# Patient Record
Sex: FEMALE | Race: WHITE | NOT HISPANIC OR LATINO | Employment: STUDENT | URBAN - NONMETROPOLITAN AREA
[De-identification: names, ages, dates, MRNs, and addresses within clinical notes are randomized per-mention and may not be internally consistent; named-entity substitution may affect disease eponyms.]

---
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
## 2024-01-08 LAB
BACTERIA SPEC AEROBE CULT: NO GROWTH
C TRACH RRNA SPEC DONR QL NAA+PROBE: NOT DETECTED
CANNABINOIDS SERPL QL: NEGATIVE
COCAINE SERPL CFM-MCNC: NEGATIVE NG/ML
EXTERNAL ABO GROUPING: NORMAL
EXTERNAL AMPHETAMINE SCREEN URINE: NEGATIVE
EXTERNAL ANTIBODY SCREEN: NORMAL
EXTERNAL BARBITURATE SCREEN URINE: NEGATIVE
EXTERNAL BENZODIAZEPINE SCREEN URINE: NEGATIVE
EXTERNAL HEMATOCRIT: 37 %
EXTERNAL HEMOGLOBIN: 11.8 G/DL
EXTERNAL HEPATITIS B SURFACE ANTIGEN: NEGATIVE
EXTERNAL METHADONE SCREEN URINE: NEGATIVE
EXTERNAL PHENCYCLIDINE SCREEN URINE: NEGATIVE
EXTERNAL PLATELET COUNT: 349 K/ΜL
EXTERNAL RH FACTOR: POSITIVE
EXTERNAL SYPHILIS RPR SCREEN: NORMAL
HCV AB S/CO SERPL IA: NORMAL
HIV 1+2 AB+HIV1 P24 AG SERPL QL IA: NORMAL
N GONORRHOEA DNA SPEC QL NAA+PROBE: NOT DETECTED
OPIATES UR QL: NEGATIVE
RUBV IGG SERPL IA-ACNC: NORMAL
VZV IGG SER QL: NORMAL

## 2024-04-30 ENCOUNTER — INITIAL PRENATAL (OUTPATIENT)
Age: 19
End: 2024-04-30
Payer: COMMERCIAL

## 2024-04-30 VITALS — DIASTOLIC BLOOD PRESSURE: 80 MMHG | WEIGHT: 168 LBS | SYSTOLIC BLOOD PRESSURE: 126 MMHG | BODY MASS INDEX: 25.54 KG/M2

## 2024-04-30 DIAGNOSIS — Z34.02 PRIMIGRAVIDA, SECOND TRIMESTER: ICD-10-CM

## 2024-04-30 DIAGNOSIS — Z3A.23 23 WEEKS GESTATION OF PREGNANCY: Primary | ICD-10-CM

## 2024-04-30 PROBLEM — Z34.90 PREGNANCY: Status: ACTIVE | Noted: 2024-04-30

## 2024-04-30 LAB
GLUCOSE UR STRIP-MCNC: NEGATIVE MG/DL
PROT UR STRIP-MCNC: NEGATIVE MG/DL

## 2024-04-30 RX ORDER — PRENATAL WITH FERROUS FUM AND FOLIC ACID 3080; 920; 120; 400; 22; 1.84; 3; 20; 10; 1; 12; 200; 27; 25; 2 [IU]/1; [IU]/1; MG/1; [IU]/1; MG/1; MG/1; MG/1; MG/1; MG/1; MG/1; UG/1; MG/1; MG/1; MG/1; MG/1
TABLET ORAL
COMMUNITY
Start: 2024-01-08

## 2024-04-30 RX ORDER — CALCIUM CARBONATE 500 MG/1
1 TABLET, CHEWABLE ORAL DAILY
COMMUNITY

## 2024-04-30 RX ORDER — DOXYLAMINE SUCCINATE AND PYRIDOXINE HYDROCHLORIDE 20; 20 MG/1; MG/1
TABLET, EXTENDED RELEASE ORAL
COMMUNITY
Start: 2024-01-08

## 2024-04-30 RX ORDER — PROMETHAZINE HYDROCHLORIDE 25 MG/1
TABLET ORAL
COMMUNITY
Start: 2024-01-08

## 2024-05-02 ENCOUNTER — REFERRAL TRIAGE (OUTPATIENT)
Dept: LABOR AND DELIVERY | Facility: HOSPITAL | Age: 19
End: 2024-05-02
Payer: COMMERCIAL

## 2024-05-02 ENCOUNTER — PATIENT OUTREACH (OUTPATIENT)
Dept: LABOR AND DELIVERY | Facility: HOSPITAL | Age: 19
End: 2024-05-02
Payer: COMMERCIAL

## 2024-05-02 NOTE — OUTREACH NOTE
[facility]  Unable to Reach       Questions/Answers      Flowsheet Row Responses   Pending Outreach Confirm Patient Interest   Call Attempt First   Outcome No answer/busy, Left message                Linda Multani RN  Maternity Nurse Navigator    5/2/2024, 15:35 CDT

## 2024-05-07 ENCOUNTER — PATIENT OUTREACH (OUTPATIENT)
Dept: LABOR AND DELIVERY | Facility: HOSPITAL | Age: 19
End: 2024-05-07
Payer: COMMERCIAL

## 2024-05-09 ENCOUNTER — TELEPHONE (OUTPATIENT)
Dept: OBSTETRICS AND GYNECOLOGY | Age: 19
End: 2024-05-09
Payer: COMMERCIAL

## 2024-05-12 PROBLEM — R11.2 NAUSEA AND VOMITING: Status: RESOLVED | Noted: 2021-04-29 | Resolved: 2024-05-12

## 2024-05-12 PROBLEM — R51.9 CHRONIC HEADACHES: Status: RESOLVED | Noted: 2019-11-04 | Resolved: 2024-05-12

## 2024-05-12 PROBLEM — G89.29 CHRONIC HEADACHES: Status: RESOLVED | Noted: 2019-11-04 | Resolved: 2024-05-12

## 2024-05-12 PROBLEM — R07.89 CHEST PAIN, ATYPICAL: Status: RESOLVED | Noted: 2022-01-04 | Resolved: 2024-05-12

## 2024-05-12 PROBLEM — R12 HEARTBURN: Status: RESOLVED | Noted: 2021-04-29 | Resolved: 2024-05-12

## 2024-05-12 PROBLEM — N94.6 DYSMENORRHEA: Status: RESOLVED | Noted: 2020-06-09 | Resolved: 2024-05-12

## 2024-05-12 PROBLEM — R42 LIGHT-HEADED FEELING: Status: RESOLVED | Noted: 2022-01-04 | Resolved: 2024-05-12

## 2024-05-12 PROBLEM — R10.12 LEFT UPPER QUADRANT PAIN: Status: RESOLVED | Noted: 2021-04-29 | Resolved: 2024-05-12

## 2024-05-14 ENCOUNTER — HOSPITAL ENCOUNTER (EMERGENCY)
Facility: HOSPITAL | Age: 19
Discharge: STILL ADMITTED OR ADMINISTRATIVE | End: 2024-05-14
Payer: COMMERCIAL

## 2024-05-14 ENCOUNTER — HOSPITAL ENCOUNTER (OUTPATIENT)
Facility: HOSPITAL | Age: 19
Discharge: HOME | End: 2024-05-14
Attending: OBSTETRICS & GYNECOLOGY | Admitting: OBSTETRICS & GYNECOLOGY
Payer: COMMERCIAL

## 2024-05-14 ENCOUNTER — APPOINTMENT (OUTPATIENT)
Dept: ULTRASOUND IMAGING | Facility: HOSPITAL | Age: 19
End: 2024-05-14
Payer: COMMERCIAL

## 2024-05-14 VITALS
HEART RATE: 97 BPM | OXYGEN SATURATION: 98 % | HEIGHT: 67 IN | TEMPERATURE: 98.8 F | BODY MASS INDEX: 26.68 KG/M2 | RESPIRATION RATE: 17 BRPM | WEIGHT: 170 LBS | DIASTOLIC BLOOD PRESSURE: 78 MMHG | SYSTOLIC BLOOD PRESSURE: 111 MMHG

## 2024-05-14 LAB
BACTERIA UR QL AUTO: ABNORMAL /HPF
BILIRUB UR QL STRIP: NEGATIVE
CLARITY UR: CLEAR
CLUE CELLS SPEC QL WET PREP: ABNORMAL
COLOR UR: YELLOW
FIBRONECTIN FETAL VAG QL: NEGATIVE
GLUCOSE UR STRIP-MCNC: NEGATIVE MG/DL
HGB UR QL STRIP.AUTO: ABNORMAL
HYALINE CASTS UR QL AUTO: ABNORMAL /LPF
HYDATID CYST SPEC WET PREP: ABNORMAL
KETONES UR QL STRIP: NEGATIVE
LEUKOCYTE ESTERASE UR QL STRIP.AUTO: ABNORMAL
NITRITE UR QL STRIP: NEGATIVE
PH UR STRIP.AUTO: 7.5 [PH] (ref 5–8)
PROT UR QL STRIP: ABNORMAL
RBC # UR STRIP: ABNORMAL /HPF
REF LAB TEST METHOD: ABNORMAL
SP GR UR STRIP: 1.01 (ref 1–1.03)
SQUAMOUS #/AREA URNS HPF: ABNORMAL /HPF
T VAGINALIS SPEC QL WET PREP: ABNORMAL
UROBILINOGEN UR QL STRIP: ABNORMAL
WBC # UR STRIP: ABNORMAL /HPF
WBC SPEC QL WET PREP: ABNORMAL
YEAST GENITAL QL WET PREP: ABNORMAL

## 2024-05-14 PROCEDURE — 25810000003 LACTATED RINGERS SOLUTION: Performed by: OBSTETRICS & GYNECOLOGY

## 2024-05-14 PROCEDURE — 25010000002 CEFTRIAXONE PER 250 MG: Performed by: OBSTETRICS & GYNECOLOGY

## 2024-05-14 PROCEDURE — G0378 HOSPITAL OBSERVATION PER HR: HCPCS

## 2024-05-14 PROCEDURE — 87210 SMEAR WET MOUNT SALINE/INK: CPT | Performed by: OBSTETRICS & GYNECOLOGY

## 2024-05-14 PROCEDURE — G0463 HOSPITAL OUTPT CLINIC VISIT: HCPCS

## 2024-05-14 PROCEDURE — 81001 URINALYSIS AUTO W/SCOPE: CPT | Performed by: OBSTETRICS & GYNECOLOGY

## 2024-05-14 PROCEDURE — 87086 URINE CULTURE/COLONY COUNT: CPT | Performed by: OBSTETRICS & GYNECOLOGY

## 2024-05-14 PROCEDURE — 76775 US EXAM ABDO BACK WALL LIM: CPT

## 2024-05-14 PROCEDURE — 82731 ASSAY OF FETAL FIBRONECTIN: CPT | Performed by: OBSTETRICS & GYNECOLOGY

## 2024-05-14 RX ORDER — SODIUM CHLORIDE 0.9 % (FLUSH) 0.9 %
10 SYRINGE (ML) INJECTION EVERY 12 HOURS SCHEDULED
Status: DISCONTINUED | OUTPATIENT
Start: 2024-05-14 | End: 2024-05-14 | Stop reason: HOSPADM

## 2024-05-14 RX ORDER — SODIUM CHLORIDE 0.9 % (FLUSH) 0.9 %
10 SYRINGE (ML) INJECTION AS NEEDED
Status: DISCONTINUED | OUTPATIENT
Start: 2024-05-14 | End: 2024-05-14 | Stop reason: HOSPADM

## 2024-05-14 RX ADMIN — CEFTRIAXONE SODIUM 2000 MG: 2 INJECTION, POWDER, FOR SOLUTION INTRAMUSCULAR; INTRAVENOUS at 09:58

## 2024-05-14 RX ADMIN — SODIUM CHLORIDE, POTASSIUM CHLORIDE, SODIUM LACTATE AND CALCIUM CHLORIDE 2000 ML: 600; 310; 30; 20 INJECTION, SOLUTION INTRAVENOUS at 09:18

## 2024-05-14 NOTE — PROGRESS NOTES
Alexa Fuller  : 2005  MRN: [id number]  CSN: [id number]    Labor & Delivery [facility] progress note    Subjective   Chief Complaint: She reports abdominal and back pain    HPI: Patient reports previously uncomplicated pregnancy with sudden increase in abdominal and back pain today    High Risk Medical Conditions/Complaints this visit:  abdominal pain    Discussion of Management or Test Interpretation with physcian/provider/healthcare provider:    External Records Reviewed: Prenatal [place] [facility] [place] [facility] [place] - Heydi Darden/Outside provider - ANNA Jenkins ultrasound reviewed, pregnancy complicated by: relux    Review Previous Test Results: Prenatal labs in [facility] reviewed, history of: varicella non-immune    Independent Historian: none    Social Determinants to Health: No drug, transportation or housing or other issues noted       Objective   Medical Decision Making:  Amount/Complexity of Data Reviewed  -Labs ordered - ffn, wet prep, urinalysis  -Imaging ordered - none  -IV fluids given - 2 liters  Risk:  Prescription drug management - Keflex  Drug therapy requiring intensive monitoring for toxicity - IV rocephin  Decision to admit patient - no  Diagnosis/Treatment limited by social determinants - no    Min/max vitals past 24 hours:  Temp  Min: 98.8 °F (37.1 °C)  Max: 99 °F (37.2 °C)   BP  Min: 111/78  Max: 120/80   Pulse  Min: 94  Max: 104   Resp  Min: 16  Max: 17        Independent Interpretation NST/FHT's: reassuring and category 1. for gestational age  external monitors used   Cervix: was checked (by RN): 0 cm / 20 % / -3   Contractions:    Review of current test: results none    Abnormal UTI       Final Diagnoses: UTI       Assessment   IUP at 25w3d  UTI     Plan   Follow up with Dr. Gonzales's    Kelsey Jenkins MD  2024  10:49 CDT

## 2024-05-14 NOTE — NURSING NOTE
Patient given written and verbal instructions with maternal warning handout and educated on reasons to return to labor and delivery including increased frequency and strength of contractions, sudden gush/leaking of fluid, vaginal bleeding, decreased fetal movement, blurry vision/spots before eyes, epigastric pain, and headache unrelieved with Tylenol.  Patient instructed to  antibiotic for UTI at [facility] pharmacy and to complete entire course.  Patient instructed to check with provider regarding urine culture to verify that antibiotic is sensitive to growth.  Patient voiced understanding and ambulated off unit with steady gait.

## 2024-05-15 LAB — BACTERIA SPEC AEROBE CULT: NORMAL

## 2024-05-16 DIAGNOSIS — O23.42 URINARY TRACT INFECTION IN MOTHER DURING SECOND TRIMESTER OF PREGNANCY: Primary | ICD-10-CM

## 2024-05-16 RX ORDER — AMPICILLIN 500 MG/1
500 CAPSULE ORAL 4 TIMES DAILY
Qty: 28 CAPSULE | Refills: 0 | Status: SHIPPED | OUTPATIENT
Start: 2024-05-16

## 2024-05-30 ENCOUNTER — ROUTINE PRENATAL (OUTPATIENT)
Dept: OBSTETRICS AND GYNECOLOGY | Age: 19
End: 2024-05-30
Payer: COMMERCIAL

## 2024-05-30 VITALS — SYSTOLIC BLOOD PRESSURE: 112 MMHG | DIASTOLIC BLOOD PRESSURE: 68 MMHG | WEIGHT: 173 LBS | BODY MASS INDEX: 27.1 KG/M2

## 2024-05-30 DIAGNOSIS — Z3A.27 27 WEEKS GESTATION OF PREGNANCY: Primary | ICD-10-CM

## 2024-05-30 DIAGNOSIS — Z34.02 PRIMIGRAVIDA, SECOND TRIMESTER: ICD-10-CM

## 2024-05-30 LAB
GLUCOSE UR STRIP-MCNC: NEGATIVE MG/DL
PROT UR STRIP-MCNC: NEGATIVE MG/DL

## 2024-05-30 NOTE — PROGRESS NOTES
Feeling well, not hurting any.  Good FM.  No abdominal pain.    No LOF or VB  Anatomy scan was normal.  EFW at 24 weeks was 41%  GCT and TDaP today  No questions or concerns.  Diagnoses and all orders for this visit:    1. 27 weeks gestation of pregnancy (Primary)  -     POC Urinalysis Dipstick    2. Primigravida, second trimester

## 2024-05-31 LAB
GLUCOSE 1H P 50 G GLC PO SERPL-MCNC: 80 MG/DL (ref 65–139)
HGB BLD-MCNC: 9.1 G/DL (ref 12–15.9)
RPR SER QL: NON REACTIVE

## 2024-06-05 ENCOUNTER — DOCUMENTATION (OUTPATIENT)
Dept: OBSTETRICS AND GYNECOLOGY | Age: 19
End: 2024-06-05
Payer: COMMERCIAL

## 2024-06-06 ENCOUNTER — HOSPITAL ENCOUNTER (OUTPATIENT)
Facility: HOSPITAL | Age: 19
Discharge: HOME | End: 2024-06-06
Attending: OBSTETRICS & GYNECOLOGY | Admitting: OBSTETRICS & GYNECOLOGY
Payer: COMMERCIAL

## 2024-06-06 ENCOUNTER — TELEPHONE (OUTPATIENT)
Dept: OBSTETRICS AND GYNECOLOGY | Age: 19
End: 2024-06-06
Payer: COMMERCIAL

## 2024-06-06 VITALS
TEMPERATURE: 98.4 F | OXYGEN SATURATION: 97 % | BODY MASS INDEX: 27.06 KG/M2 | HEART RATE: 120 BPM | DIASTOLIC BLOOD PRESSURE: 66 MMHG | HEIGHT: 67 IN | SYSTOLIC BLOOD PRESSURE: 115 MMHG | WEIGHT: 172.4 LBS

## 2024-06-06 LAB
BACTERIA UR QL AUTO: ABNORMAL /HPF
BILIRUB UR QL STRIP: NEGATIVE
CLARITY UR: ABNORMAL
CLUE CELLS SPEC QL WET PREP: ABNORMAL
COLOR UR: YELLOW
GLUCOSE UR STRIP-MCNC: NEGATIVE MG/DL
HGB UR QL STRIP.AUTO: NEGATIVE
HYALINE CASTS UR QL AUTO: ABNORMAL /LPF
HYDATID CYST SPEC WET PREP: ABNORMAL
KETONES UR QL STRIP: NEGATIVE
LEUKOCYTE ESTERASE UR QL STRIP.AUTO: ABNORMAL
NITRITE UR QL STRIP: NEGATIVE
PH UR STRIP.AUTO: >=9 [PH] (ref 5–8)
PROT UR QL STRIP: NEGATIVE
RBC # UR STRIP: ABNORMAL /HPF
REF LAB TEST METHOD: ABNORMAL
SP GR UR STRIP: 1.01 (ref 1–1.03)
SQUAMOUS #/AREA URNS HPF: ABNORMAL /HPF
T VAGINALIS SPEC QL WET PREP: ABNORMAL
UROBILINOGEN UR QL STRIP: ABNORMAL
WBC # UR STRIP: ABNORMAL /HPF
WBC SPEC QL WET PREP: ABNORMAL
YEAST GENITAL QL WET PREP: ABNORMAL

## 2024-06-06 PROCEDURE — G0463 HOSPITAL OUTPT CLINIC VISIT: HCPCS

## 2024-06-06 PROCEDURE — G0378 HOSPITAL OBSERVATION PER HR: HCPCS

## 2024-06-06 PROCEDURE — 81001 URINALYSIS AUTO W/SCOPE: CPT | Performed by: OBSTETRICS & GYNECOLOGY

## 2024-06-06 PROCEDURE — 87086 URINE CULTURE/COLONY COUNT: CPT | Performed by: OBSTETRICS & GYNECOLOGY

## 2024-06-06 PROCEDURE — 87210 SMEAR WET MOUNT SALINE/INK: CPT | Performed by: OBSTETRICS & GYNECOLOGY

## 2024-06-06 RX ORDER — METRONIDAZOLE 500 MG/1
500 TABLET ORAL 2 TIMES DAILY
Qty: 14 TABLET | Refills: 0 | Status: SHIPPED | OUTPATIENT
Start: 2024-06-06 | End: 2024-06-13

## 2024-06-06 NOTE — NURSING NOTE
Patient educated on S/S of labor, educated to return to L&D for consistent, painful contractions, leaking of fluid, vaginal bleeding, or decreased fetal movement. Urgent maternal warnings signs reviewed and handout given.    Stephany Moyer RN  10:03 CDT

## 2024-06-06 NOTE — TELEPHONE ENCOUNTER
[facility] staff attempted to follow warm transfer process and was unsuccessful     Caller: Felicity Fuller    Relationship to patient: Self    Best call back number: [phone] / LVM    Patient is needing: OB PT IS CALLING IN WITH [facility] [place] - STARTED YESTERDAY AND HAS CONTINUED THIS MORNING - PT IS WANTING TO KNOW IF SHE CAN BE SEEN TODAY - [facility] WAS UNABLE TO WT TO OFFICE

## 2024-06-06 NOTE — ED NOTES
"[facility]   Felicity Fuller  : 2005  MRN: [id number]  CSN: [id number]    History and Physical    Subjective   Felicity Fuller is a 19 y.o.  at 28 weeks of gestation, known chakraborty syndrome, reports pelvic pressure pain and  burning, denies itching, bleeding or leaking fluid or discharge, reports good mov, able to eat and drink.    Past Medical History:   Diagnosis Date    Chronic headaches 2019    Headache     POTS (postural orthostatic tachycardia syndrome)      Past Surgical History:   Procedure Laterality Date    ENDOSCOPY N/A 2021    Procedure: ESOPHAGOGASTRODUODENOSCOPY WITH ANESTHESIA;  Surgeon: Teresa Chamberlain MD;  Location: [facility] ENDOSCOPY;  Service: Gastroenterology;  Laterality: N/A;  preop; GERD  postop; esophagitis   PCP Tono Burnett     EXTERNAL EAR SURGERY       Social History    Tobacco Use      Smoking status: Never      Smokeless tobacco: Never    No current facility-administered medications for this encounter.    Current Outpatient Medications:     Prenatal Vit-Fe Fumarate-FA (Prenatal 27-) 27-1 MG tablet tablet, , Disp: , Rfl:     metroNIDAZOLE (Flagyl) 500 MG tablet, Take 1 tablet by mouth 2 (Two) Times a Day for 7 days., Disp: 14 tablet, Rfl: 0    No Known Allergies    Review of Systems      Objective   /66 (BP Location: Right arm, Patient Position: Lying)   Pulse 120   Temp 98.4 °F (36.9 °C) (Oral)   Ht 170.2 cm (67\")   Wt 78.2 kg (172 lb 6.4 oz)   LMP 2023   SpO2 97%   BMI 27.00 kg/m²   General: well developed; well nourished  no acute distress   Heart: regular rate and rhythm, S1, S2 normal, no murmur, click, rub or gallop   Lungs: breathing is unlabored   Abdomen: soft, non-tender; no masses  no umbilical or inguinal hernias are present  no hepato-splenomegaly   Pelvis:: Clinical staff was present for exam, external genitalia, no lesion noted, wet prep  positive for  clue cells, cervix  40%, 50%/-3 vertex/ intact   Labs  Lab " Results   Component Value Date     01/08/2024    HGB 9.1 (L) 05/30/2024    HCT 37 01/08/2024    WBC 7.9 01/03/2022     01/03/2022    K 4.6 01/03/2022     01/03/2022    CO2 21 01/03/2022    BUN 14 01/03/2022    CREATININE 0.88 01/03/2022    GLUCOSE 100 (H) 01/03/2022    ALBUMIN 4.5 01/03/2022    CALCIUM 9.6 01/03/2022    AST 16 01/03/2022    ALT 9 01/03/2022    BILITOT 0.3 01/03/2022        Assessment   Vaginitis  28 weeks    The risks, benefits, and alternatives of the procedure; along with the risks of anesthesia was discussed in full with the patient and all questions were answered.       Plan   Flagyl, instructions given    Shiva Lomeli MD  6/6/2024  10:25 CDT

## 2024-06-06 NOTE — PROGRESS NOTES
Patient called with complaint of vaginal discomfort almost a stabbing type sensation. She denies any pain with urination. States baby is moving well. She is 28 weeks pregnant. She is given the option of going into labor and delivery for evaluation. She will see if it stops within the next few minutes and if not she will go to labor and delivery.

## 2024-06-06 NOTE — TELEPHONE ENCOUNTER
Pt left this message through HUB, left v/m on nurse line and came in person to the office. Pt states she had contacted [facility] and they had advised her that with continuing pain to come there for evaluation. Pt sent to [facility]

## 2024-06-07 LAB — BACTERIA SPEC AEROBE CULT: NO GROWTH

## 2024-06-14 ENCOUNTER — ROUTINE PRENATAL (OUTPATIENT)
Dept: OBSTETRICS AND GYNECOLOGY | Age: 19
End: 2024-06-14
Payer: COMMERCIAL

## 2024-06-14 VITALS — WEIGHT: 175 LBS | SYSTOLIC BLOOD PRESSURE: 98 MMHG | DIASTOLIC BLOOD PRESSURE: 68 MMHG | BODY MASS INDEX: 27.41 KG/M2

## 2024-06-14 DIAGNOSIS — D50.9 IRON DEFICIENCY ANEMIA DURING PREGNANCY: ICD-10-CM

## 2024-06-14 DIAGNOSIS — O99.019 IRON DEFICIENCY ANEMIA DURING PREGNANCY: ICD-10-CM

## 2024-06-14 DIAGNOSIS — Z34.03 ENCOUNTER FOR SUPERVISION OF NORMAL FIRST PREGNANCY IN THIRD TRIMESTER: ICD-10-CM

## 2024-06-14 DIAGNOSIS — Z3A.29 29 WEEKS GESTATION OF PREGNANCY: Primary | ICD-10-CM

## 2024-06-14 LAB
GLUCOSE UR STRIP-MCNC: NEGATIVE MG/DL
PROT UR STRIP-MCNC: NEGATIVE MG/DL

## 2024-06-14 NOTE — PROGRESS NOTES
"Feeling \"good\".  No pain.  Good FM. No LOF or VB  No swelling  No questions or concerns.  4D u/s at next visit.  Discussed need for OTC iron once dailiy    Diagnoses and all orders for this visit:    1. 29 weeks gestation of pregnancy (Primary)  -     POC Urinalysis Dipstick    2. Encounter for supervision of normal first pregnancy in third trimester    3. Iron deficiency anemia during pregnancy       "

## 2024-06-28 ENCOUNTER — ROUTINE PRENATAL (OUTPATIENT)
Age: 19
End: 2024-06-28
Payer: COMMERCIAL

## 2024-06-28 VITALS — SYSTOLIC BLOOD PRESSURE: 104 MMHG | BODY MASS INDEX: 28.19 KG/M2 | WEIGHT: 180 LBS | DIASTOLIC BLOOD PRESSURE: 68 MMHG

## 2024-06-28 DIAGNOSIS — Z3A.31 31 WEEKS GESTATION OF PREGNANCY: Primary | ICD-10-CM

## 2024-06-28 DIAGNOSIS — O99.019 IRON DEFICIENCY ANEMIA DURING PREGNANCY: ICD-10-CM

## 2024-06-28 DIAGNOSIS — D50.9 IRON DEFICIENCY ANEMIA DURING PREGNANCY: ICD-10-CM

## 2024-06-28 DIAGNOSIS — Z34.03 PRIMIGRAVIDA IN THIRD TRIMESTER: ICD-10-CM

## 2024-06-28 LAB
GLUCOSE UR STRIP-MCNC: NEGATIVE MG/DL
PROT UR STRIP-MCNC: ABNORMAL MG/DL

## 2024-06-28 NOTE — PROGRESS NOTES
Reason for visit: Routine OB visit at 31w6d     CC:  Feeling well. Endorses regular fetal movement. Denies VB, LOF, contractions, h/a, visual changes, and right upper quadrant pain.     ROS: All systems reviewed and are negative with exception of the following: amenorrhea    Wt 81.6 kg (180 lb) lb for a TWG of 20.4 kg (45 lb), /68, FHTs 144, FH 32  Urine today and reviewed: neg glucose, tr protein    Anatomy scan: EFW: 41%; posterior fundal placenta with no evidence of placenta previa    Exam:  General Appearance:  No visualized signs of distress. Normal mood and behavior  Cardiorespiratory: HR str and reg. Lungs clear. Resp even and unlabored.  Abdomen: is soft and nontender. No CVA tenderness. Uterus is Soft and round.  Ext: No edema. Calves non-tender.     Impression  Diagnoses and all orders for this visit:    1. 31 weeks gestation of pregnancy (Primary)  Discussed third trimester of pregnancy, discomforts and measures of support, fetal movement counts,  labor warnings, preeclampsia warnings, and signs and symptoms to report. Reviewed glucose tolerance test and hemoglobin results. Patient to notify provider and/or come to the hospital with vaginal bleeding, leaking of fluid, contraction, or other concerns. Third Trimester of Pregnancy video included in the AVS.   -     POC Urinalysis Dipstick    2. Primigravida in third trimester    3. Iron deficiency anemia during pregnancy        Return to the office in 2 weeks for routine follow up and as needed with concerns.    This note has been signed electronically.  RENAE Dunbar, JOAQUINA

## 2024-07-03 ENCOUNTER — HOSPITAL ENCOUNTER (OUTPATIENT)
Facility: HOSPITAL | Age: 19
Discharge: HOME | End: 2024-07-03
Attending: OBSTETRICS & GYNECOLOGY | Admitting: OBSTETRICS & GYNECOLOGY
Payer: COMMERCIAL

## 2024-07-03 VITALS
RESPIRATION RATE: 14 BRPM | HEART RATE: 87 BPM | HEIGHT: 67 IN | OXYGEN SATURATION: 99 % | BODY MASS INDEX: 28.91 KG/M2 | SYSTOLIC BLOOD PRESSURE: 113 MMHG | DIASTOLIC BLOOD PRESSURE: 73 MMHG | WEIGHT: 184.2 LBS | TEMPERATURE: 97.8 F

## 2024-07-03 PROCEDURE — G0378 HOSPITAL OBSERVATION PER HR: HCPCS

## 2024-07-03 PROCEDURE — G0463 HOSPITAL OUTPT CLINIC VISIT: HCPCS

## 2024-07-03 RX ORDER — FERROUS GLUCONATE 324(38)MG
324 TABLET ORAL
COMMUNITY

## 2024-07-03 NOTE — NURSING NOTE
Felicity Fuller, a  at 32w4d with an ANDER of 2024, by Ultrasound, was seen at [facility] LABOR DELIVERY for a nonstress test.    Chief Complaint   Patient presents with    Decreased Fetal Movement     Last felt baby move an hour ago       Patient Active Problem List   Diagnosis    Bilateral fibrocystic breast disease (FCBD)    Gastroesophageal reflux disease with esophagitis without hemorrhage    POTS (postural orthostatic tachycardia syndrome)    Pregnancy       Start Time: 1300  Stop Time: 1330    Interpretation A  Nonstress Test Interpretation A: Reactive (24 1330 : Lillie Davila, RN)  Comments A: Verified by LINWOOD Davila, ROSA and ASHA Perrin RN (24 1330 : Lillie Davila, ROSA)    Lillie Davila, ROSA

## 2024-07-03 NOTE — PROGRESS NOTES
[facility]  Felicity Fuller  : 2005  MRN: [id number]  CSN: [id number]    Labor & Delivery BONNIE progress note    Subjective   Chief Complaint: She reports decreased fetal movement. Feeling baby move, but somewhat less than previous. Educated on fetal kick counts    HPI: see above    History:    Prenatal Information:  Prenatal Results       Initial Prenatal Labs       Test Value Reference Range Date Time    Hemoglobin ^ 11.8 g/dL  24     Hematocrit ^ 37 %  24     Platelets ^ 349 K/µL  24     Rubella IgG ^ immune   24     Hepatitis B SAg ^ Negative   24     Hepatitis C Ab ^ non-reactive   24     RPR  Non Reactive  Non Reactive 24 0902      ^ Non-Reactive   24     T. Pallidum Ab         ABO ^ O   24     Rh ^ Positive   24     Antibody Screen ^ Normal  Normal 24     HIV ^ non-reactive   24     Urine Culture  No growth   24 0918       25,000 CFU/mL Normal Urogenital Monalisa   24 0857      ^ No growth  No growth at 24 hours, No growth at 2 days, No growth at 3 days, No growth, Growth present, too young to evaluate, Culture in progress 24     Gonorrhea ^ not detected   24     Chlamydia ^ not detected   24     TSH        HgB A1c         Varicella IgG ^ non-immune   24     Hemoglobinopathy Fractionation        Hemoglobinopathy (genetic testing)        Cystic fibrosis                   Fetal testing        Test Value Reference Range Date Time    NIPT        MSAFP        AFP-4                  2nd and 3rd Trimester       Test Value Reference Range Date Time    Hemoglobin (repeated)  9.1 g/dL 12.0 - 15.9 24 0902    Hematocrit (repeated)        Platelets  ^ 349 K/µL  24     1 hour GTT   80 mg/dL 65 - 139 24 09    Antibody Screen (repeated)        3rd TM syphilis scrn (repeated)  RPR   Non Reactive  Non Reactive 24    3rd TM syphilis scrn (repeated) FTA        GTT Fasting         GTT 1 Hr        GTT 2 Hr        GTT 3 Hr        Group B Strep                  Other testing        Test Value Reference Range Date Time    Parvo IgG         CMV IgG                   Drug Screening       Test Value Reference Range Date Time    Amphetamine Screen ^ Negative   01/08/24     Barbiturate Screen ^ negative   01/08/24     Benzodiazepine Screen ^ Negative   01/08/24     Methadone Screen ^ Negative   01/08/24     Phencyclidine Screen ^ negative   01/08/24     Opiates Screen ^ Negative  Negative 01/08/24     THC Screen ^ Negative  Negative 01/08/24     Cocaine Screen ^ negative   01/08/24     Propoxyphene Screen        Buprenorphine Screen        Methamphetamine Screen        Oxycodone Screen        Tricyclic Antidepressants Screen                  Legend    ^: Historical                          External Prenatal Results       Pregnancy Outside Results - Transcribed From Office Records - See Scanned Records For Details       Test Value Date Time    ABO ^ O  01/08/24     Rh ^ Positive  01/08/24     Antibody Screen ^ Normal  01/08/24     Varicella IgG ^ non-immune  01/08/24     Rubella ^ immune  01/08/24     Hgb  9.1 g/dL 05/30/24 0902      ^ 11.8 g/dL 01/08/24     Hct ^ 37 % 01/08/24     HgB A1c        1h GTT  80 mg/dL 05/30/24 0902    3h GTT Fasting       3h GTT 1 hour       3h GTT 2 hour       3h GTT 3 hour        Gonorrhea (discrete) ^ not detected  01/08/24     Chlamydia (discrete) ^ not detected  01/08/24     RPR  Non Reactive  05/30/24 0902      ^ Non-Reactive  01/08/24     Syphilis Antibody       HBsAg ^ Negative  01/08/24     Herpes Simplex Virus PCR       Herpes Simplex VIrus Culture       HIV ^ non-reactive  01/08/24     Hep C RNA Quant PCR       Hep C Antibody ^ non-reactive  01/08/24     AFP       NIPT       Cystic Fibroisis        Group B Strep       GBS Susceptibility to Clindamycin       GBS Susceptibility to Erythromycin       Fetal Fibronectin  Negative  05/14/24 0902    Genetic Testing,  Maternal Blood                 Drug Screening       Test Value Date Time    Urine Drug Screen       Amphetamine Screen ^ Negative  24     Barbiturate Screen ^ negative  24     Benzodiazepine Screen ^ Negative  24     Methadone Screen ^ Negative  24     Phencyclidine Screen ^ negative  24     Opiates Screen ^ Negative  24     THC Screen ^ Negative  24     Cocaine Screen ^ negative  24     Propoxyphene Screen       Buprenorphine Screen       Methamphetamine Screen       Oxycodone Screen       Tricyclic Antidepressants Screen                 Legend    ^: Historical                             Past OB History:     OB History    Para Term  AB Living   1 0 0 0 0 0   SAB IAB Ectopic Molar Multiple Live Births   0 0 0 0 0 0      # Outcome Date GA Lbr Magan/2nd Weight Sex Type Anes PTL Lv   1 Current                Past Medical History: Past Medical History:   Diagnosis Date    Chronic headaches 2019    Headache     POTS (postural orthostatic tachycardia syndrome)       Past Surgical History Past Surgical History:   Procedure Laterality Date    ENDOSCOPY N/A 2021    Procedure: ESOPHAGOGASTRODUODENOSCOPY WITH ANESTHESIA;  Surgeon: Teresa Chamberlain MD;  Location: [facility] ENDOSCOPY;  Service: Gastroenterology;  Laterality: N/A;  preop; GERD  postop; esophagitis   PCP Tono Burnett     EXTERNAL EAR SURGERY        Family History: Family History   Problem Relation Age of Onset    No Known Problems Mother     No Known Problems Father     Diabetes Maternal Grandmother     Colon cancer Neg Hx     Colon polyps Neg Hx     Esophageal cancer Neg Hx     Liver cancer Neg Hx     Liver disease Neg Hx     Stomach cancer Neg Hx     Rectal cancer Neg Hx     Breast cancer Neg Hx     Ovarian cancer Neg Hx     Uterine cancer Neg Hx       Social History:  reports that she has never smoked. She has never used smokeless tobacco.   reports that she does not currently use alcohol.    reports no history of drug use.           High Risk Medical Conditions/Complaints this visit: n/a    Discussion of Management or Test Interpretation with physcian/provider/healthcare provider: n/a    External Records Reviewed: Prenatal history in [facility] from [facility], Outside OB provider - ANNA Jenkins ultrasound reviewed, pregnancy complicated by: anemia    Review Previous Test Results: Prenatal labs in [facility] reviewed, history of: anemia, varicella non-immune    Independent Historian: none    Social Determinants to Health: No drug, transportation or housing or other issues noted - no       Objective   Medical Decision Making:  Amount/Complexity of Data Reviewed  -Labs ordered - no  -Imaging ordered - no  -IV fluids given - no  Risk:  Prescription drug management - no  Drug therapy requiring intensive monitoring for toxicity - no  Decision to admit patient - no  Diagnosis/Treatment limited by social determinants - n/a    Min/max vitals past 24 hours:  No data recorded - reported as normal, RN failed to verify in system   No data recorded   No data recorded   No data recorded        Independent Interpretation NST/FHT's: reassuring and category 1.  external monitors used   Cervix: was not checked.   Contractions:    Review of current test: results none    N/a       Final Diagnoses: Reassuring fetal surveillance       Assessment   IUP at 32w4d  Reassuring fetal surveillance     Plan   OK for discharge with reactive NST    Kelsey Jenkins MD  7/3/2024  13:00 CDT

## 2024-07-12 ENCOUNTER — ROUTINE PRENATAL (OUTPATIENT)
Dept: OBSTETRICS AND GYNECOLOGY | Age: 19
End: 2024-07-12
Payer: COMMERCIAL

## 2024-07-12 VITALS — SYSTOLIC BLOOD PRESSURE: 112 MMHG | WEIGHT: 183 LBS | DIASTOLIC BLOOD PRESSURE: 72 MMHG | BODY MASS INDEX: 28.66 KG/M2

## 2024-07-12 DIAGNOSIS — Z3A.33 33 WEEKS GESTATION OF PREGNANCY: Primary | ICD-10-CM

## 2024-07-12 DIAGNOSIS — Z34.03 PRIMIGRAVIDA IN THIRD TRIMESTER: ICD-10-CM

## 2024-07-12 DIAGNOSIS — D50.9 IRON DEFICIENCY ANEMIA DURING PREGNANCY: ICD-10-CM

## 2024-07-12 DIAGNOSIS — O99.019 IRON DEFICIENCY ANEMIA DURING PREGNANCY: ICD-10-CM

## 2024-07-12 LAB
GLUCOSE UR STRIP-MCNC: NEGATIVE MG/DL
PROT UR STRIP-MCNC: NEGATIVE MG/DL

## 2024-07-12 NOTE — PROGRESS NOTES
"Round ligament pain is only complaint.  Patient says she is feeling \"good\"  Good FM  No contractions.  No LOF or VB  Hgb was 9.1 at 28 weeks, taking daily supplementation    Diagnoses and all orders for this visit:    1. 33 weeks gestation of pregnancy (Primary)  -     POC Urinalysis Dipstick    2. Primigravida in third trimester    3. Iron deficiency anemia during pregnancy       "

## 2024-07-18 ENCOUNTER — TELEPHONE (OUTPATIENT)
Age: 19
End: 2024-07-18
Payer: COMMERCIAL

## 2024-07-18 NOTE — TELEPHONE ENCOUNTER
Pt called to update our office she is being seen and treated by her PCP for an illness.  Pt tested negative for covid, flu and strep.  Pt c/o cough and congestion.  Pt prescribed antibiotic and inhaler by her PCP.  Pt advised to follow up with PCP for any worsening symptoms and to update our office as well.  Pt advised to increase fluid intake.  Pt voiced understanding.

## 2024-07-26 ENCOUNTER — ROUTINE PRENATAL (OUTPATIENT)
Age: 19
End: 2024-07-26
Payer: COMMERCIAL

## 2024-07-26 VITALS — WEIGHT: 182 LBS | BODY MASS INDEX: 28.51 KG/M2 | SYSTOLIC BLOOD PRESSURE: 122 MMHG | DIASTOLIC BLOOD PRESSURE: 70 MMHG

## 2024-07-26 DIAGNOSIS — Z34.03 PRIMIGRAVIDA IN THIRD TRIMESTER: ICD-10-CM

## 2024-07-26 DIAGNOSIS — O99.019 IRON DEFICIENCY ANEMIA DURING PREGNANCY: ICD-10-CM

## 2024-07-26 DIAGNOSIS — D50.9 IRON DEFICIENCY ANEMIA DURING PREGNANCY: ICD-10-CM

## 2024-07-26 DIAGNOSIS — Z3A.35 35 WEEKS GESTATION OF PREGNANCY: Primary | ICD-10-CM

## 2024-07-26 LAB
GLUCOSE UR STRIP-MCNC: NEGATIVE MG/DL
PROT UR STRIP-MCNC: ABNORMAL MG/DL

## 2024-07-26 RX ORDER — B-COMPLEX WITH VITAMIN C
1 TABLET ORAL DAILY
COMMUNITY

## 2024-07-26 RX ORDER — ALBUTEROL SULFATE 90 UG/1
1 AEROSOL, METERED RESPIRATORY (INHALATION) EVERY 4 HOURS PRN
COMMUNITY
Start: 2024-07-18

## 2024-07-26 NOTE — PROGRESS NOTES
Reason for visit: Routine OB visit at 35w6d     CC:  Cramping and diarrhea for the past 3 days. Endorses appropriate fetal movement. Denies VB, LOF, contractions, h/a, visual changes, and right upper quadrant pain.     ROS: All systems reviewed and are negative with exception of the following: cramping, diarrhea, amenorrhea    Wt 82.6 kg (182 lb) lb for a TWG of 21.3 kg (47 lb), /70, FHTs 130, FH 35  Urine today and reviewed: neg glucose, +1 protein    Anatomy scan: EFW 41%; posterior placenta with no evidence of placenta previa    Exam:  General Appearance:  No visualized signs of distress. Normal mood and behavior  Cardiorespiratory: HR str and reg. Lungs clear. Resp even and unlabored.  Abdomen: is soft and nontender. No CVA tenderness. Uterus is round and soft.  Ext:  edema. Calves non-tender.     Impression  Diagnoses and all orders for this visit:    1. 35 weeks gestation of pregnancy (Primary)  .Discussed third trimester of pregnancy, discomforts and measures of support, fetal movement counts,  labor warnings, preeclampsia warnings, and signs and symptoms to report. Discussed plan for next visit to include cultures for GBS, chlamydia, and gonorrhea. Patient to come to the hospital or call for vaginal bleeding, leaking of fluid, regular or intense contractions, or other concerns. Signs and Symptoms of Labor and Alternative Pain Management During Labor and Delivery videos included in the AVS.   -     POC Urinalysis Dipstick    2. Primigravida in third trimester    3. Iron deficiency anemia during pregnancy  Taking her iron          Return to the office in 1 week for routine follow up and as needed with concerns.    This note has been signed electronically.  RENAE Dunbar, JOAQUINA

## 2024-08-02 ENCOUNTER — ROUTINE PRENATAL (OUTPATIENT)
Dept: OBSTETRICS AND GYNECOLOGY | Age: 19
End: 2024-08-02
Payer: COMMERCIAL

## 2024-08-02 VITALS — DIASTOLIC BLOOD PRESSURE: 82 MMHG | WEIGHT: 183 LBS | SYSTOLIC BLOOD PRESSURE: 118 MMHG | BODY MASS INDEX: 28.66 KG/M2

## 2024-08-02 DIAGNOSIS — Z3A.36 36 WEEKS GESTATION OF PREGNANCY: Primary | ICD-10-CM

## 2024-08-02 DIAGNOSIS — Z34.03 PRIMIGRAVIDA IN THIRD TRIMESTER: ICD-10-CM

## 2024-08-02 LAB
GLUCOSE UR STRIP-MCNC: NEGATIVE MG/DL
PROT UR STRIP-MCNC: NEGATIVE MG/DL

## 2024-08-02 NOTE — PROGRESS NOTES
"Patient tired.  She also c/o pelvic pressure and \"muscle pains\" in abdomen and thighs.  Also having some low back discomfort.    No contractions.  No LOF and no VB.  Good FM  No swelling.  Cultures collected.  Cx closed/th/high    Diagnoses and all orders for this visit:    1. 36 weeks gestation of pregnancy (Primary)  -     POC Urinalysis Dipstick  -     Chlamydia trachomatis, Neisseria gonorrhoeae, PCR w/ confirmation - Swab, Vagina  -     Strep B Screen - Swab, Vaginal/Rectum    2. Primigravida in third trimester       "

## 2024-08-06 LAB
C TRACH RRNA SPEC QL NAA+PROBE: NEGATIVE
GP B STREP DNA SPEC QL NAA+PROBE: NEGATIVE
N GONORRHOEA RRNA SPEC QL NAA+PROBE: NEGATIVE

## 2024-08-07 NOTE — PROGRESS NOTES
Reason for visit: Routine OB visit at 37w6d     CC:  Tachycardia/dizziness this morning. States it is probably just her POTS. Endorses appropriate fetal movement. Denies VB, LOF, contractions, h/a, visual changes, and right upper quadrant pain.     ROS: All systems reviewed and are negative with exception of the following: amenorrhea, tachycardia    Wt 83.9 kg (185 lb) lb for a TWG of 22.7 kg (50 lb), /84, Pulse 100, FHTs 148, FH 37  Urine today and reviewed: neg glucose, tr protein    Anatomy scan: EFW 41%; posterior fundal placenta with no evidence of placenta previa.    Exam:  General Appearance:  No visualized signs of distress. Normal mood and behavior  Cardiorespiratory: HR str and reg. Lungs clear. Resp even and unlabored.  Abdomen: is soft and nontender. no CVA tenderness. Uterus is round and soft.  Ext: no edema. Calves non-tender.     Impression  Diagnoses and all orders for this visit:    1. 37 weeks gestation of pregnancy (Primary)  Discussed third trimester discomforts and measures of support, fetal movement counts, signs of labor, preeclampsia warnings, and signs and symptoms to report. Reviewed GBS -. Discussed and encouraged to come to the hospital or call with vaginal bleeding, leaking of fluid, regular contractions, or other concerns. Signs and Symptoms of Labor and Alternative Pain Management During Labor and Delivery videos included in the AVS.   -     POC Urinalysis Dipstick    2. Primigravida in third trimester    3. Iron deficiency anemia during pregnancy  Taking Ferrous Sulfate as prescribed      4. POTS   Pt hydrating and supplementing sodium        Return to the office in 1 week for routine follow up with Dr. James and as needed with concerns.    This note has been signed electronically.  RENAE Dunbar, JOAQUINA

## 2024-08-09 ENCOUNTER — ROUTINE PRENATAL (OUTPATIENT)
Age: 19
End: 2024-08-09
Payer: COMMERCIAL

## 2024-08-09 VITALS — DIASTOLIC BLOOD PRESSURE: 84 MMHG | SYSTOLIC BLOOD PRESSURE: 106 MMHG | BODY MASS INDEX: 28.98 KG/M2 | WEIGHT: 185 LBS

## 2024-08-09 DIAGNOSIS — Z3A.37 37 WEEKS GESTATION OF PREGNANCY: Primary | ICD-10-CM

## 2024-08-09 DIAGNOSIS — O99.019 IRON DEFICIENCY ANEMIA DURING PREGNANCY: ICD-10-CM

## 2024-08-09 DIAGNOSIS — G90.A POTS (POSTURAL ORTHOSTATIC TACHYCARDIA SYNDROME): ICD-10-CM

## 2024-08-09 DIAGNOSIS — D50.9 IRON DEFICIENCY ANEMIA DURING PREGNANCY: ICD-10-CM

## 2024-08-09 DIAGNOSIS — Z34.03 PRIMIGRAVIDA IN THIRD TRIMESTER: ICD-10-CM

## 2024-08-09 LAB
GLUCOSE UR STRIP-MCNC: NEGATIVE MG/DL
PROT UR STRIP-MCNC: ABNORMAL MG/DL

## 2024-08-16 ENCOUNTER — ROUTINE PRENATAL (OUTPATIENT)
Dept: OBSTETRICS AND GYNECOLOGY | Age: 19
End: 2024-08-16
Payer: COMMERCIAL

## 2024-08-16 VITALS — WEIGHT: 186 LBS | BODY MASS INDEX: 29.13 KG/M2 | SYSTOLIC BLOOD PRESSURE: 112 MMHG | DIASTOLIC BLOOD PRESSURE: 70 MMHG

## 2024-08-16 DIAGNOSIS — Z3A.38 38 WEEKS GESTATION OF PREGNANCY: Primary | ICD-10-CM

## 2024-08-16 DIAGNOSIS — G90.A POTS (POSTURAL ORTHOSTATIC TACHYCARDIA SYNDROME): ICD-10-CM

## 2024-08-16 DIAGNOSIS — Z34.03 PRIMIGRAVIDA IN THIRD TRIMESTER: ICD-10-CM

## 2024-08-16 LAB
GLUCOSE UR STRIP-MCNC: NEGATIVE MG/DL
PROT UR STRIP-MCNC: NEGATIVE MG/DL

## 2024-08-16 NOTE — PROGRESS NOTES
Patient uncomfortable, but not sure whether it is just growing size or actual contractions.  No LOF or VB.  Good FM  trace edema now, but patient reports feet have been swollen some this week, especially yesterday.  Cx now 3/60/-3  Labor precautions reviewed    Diagnoses and all orders for this visit:    1. 38 weeks gestation of pregnancy (Primary)  -     POC Urinalysis Dipstick    2. Primigravida in third trimester    3. POTS (postural orthostatic tachycardia syndrome)

## 2024-08-20 ENCOUNTER — HOSPITAL ENCOUNTER (INPATIENT)
Facility: HOSPITAL | Age: 19
LOS: 2 days | Discharge: HOME | End: 2024-08-22
Attending: OBSTETRICS & GYNECOLOGY | Admitting: OBSTETRICS & GYNECOLOGY
Payer: COMMERCIAL

## 2024-08-20 ENCOUNTER — ANESTHESIA (OUTPATIENT)
Dept: LABOR AND DELIVERY | Facility: HOSPITAL | Age: 19
End: 2024-08-20
Payer: COMMERCIAL

## 2024-08-20 ENCOUNTER — ANESTHESIA EVENT (OUTPATIENT)
Dept: LABOR AND DELIVERY | Facility: HOSPITAL | Age: 19
End: 2024-08-20
Payer: COMMERCIAL

## 2024-08-20 PROBLEM — Z34.90 PREGNANCY: Status: RESOLVED | Noted: 2024-04-30 | Resolved: 2024-08-20

## 2024-08-20 LAB
ABO GROUP BLD: NORMAL
BLD GP AB SCN SERPL QL: NEGATIVE
DEPRECATED RDW RBC AUTO: 42.6 FL (ref 37–54)
ERYTHROCYTE [DISTWIDTH] IN BLOOD BY AUTOMATED COUNT: 14.8 % (ref 12.3–15.4)
HCT VFR BLD AUTO: 28.8 % (ref 34–46.6)
HGB BLD-MCNC: 8.8 G/DL (ref 12–15.9)
MCH RBC QN AUTO: 24.1 PG (ref 26.6–33)
MCHC RBC AUTO-ENTMCNC: 30.6 G/DL (ref 31.5–35.7)
MCV RBC AUTO: 78.9 FL (ref 79–97)
PLATELET # BLD AUTO: 327 10*3/MM3 (ref 140–450)
PMV BLD AUTO: 9.9 FL (ref 6–12)
RBC # BLD AUTO: 3.65 10*6/MM3 (ref 3.77–5.28)
RH BLD: POSITIVE
T&S EXPIRATION DATE: NORMAL
TREPONEMA PALLIDUM IGG+IGM AB [PRESENCE] IN SERUM OR PLASMA BY IMMUNOASSAY: NORMAL
WBC NRBC COR # BLD AUTO: 14.96 10*3/MM3 (ref 3.4–10.8)

## 2024-08-20 PROCEDURE — 25810000003 LACTATED RINGERS SOLUTION: Performed by: OBSTETRICS & GYNECOLOGY

## 2024-08-20 PROCEDURE — 85027 COMPLETE CBC AUTOMATED: CPT | Performed by: OBSTETRICS & GYNECOLOGY

## 2024-08-20 PROCEDURE — 59410 OBSTETRICAL CARE: CPT | Performed by: OBSTETRICS & GYNECOLOGY

## 2024-08-20 PROCEDURE — 25010000002 ROPIVACAINE PER 1 MG: Performed by: NURSE ANESTHETIST, CERTIFIED REGISTERED

## 2024-08-20 PROCEDURE — 86901 BLOOD TYPING SEROLOGIC RH(D): CPT | Performed by: OBSTETRICS & GYNECOLOGY

## 2024-08-20 PROCEDURE — 25010000002 MORPHINE SULFATE (PF) 2 MG/ML SOLUTION: Performed by: OBSTETRICS & GYNECOLOGY

## 2024-08-20 PROCEDURE — S0260 H&P FOR SURGERY: HCPCS | Performed by: OBSTETRICS & GYNECOLOGY

## 2024-08-20 PROCEDURE — 86900 BLOOD TYPING SEROLOGIC ABO: CPT | Performed by: OBSTETRICS & GYNECOLOGY

## 2024-08-20 PROCEDURE — 86780 TREPONEMA PALLIDUM: CPT | Performed by: OBSTETRICS & GYNECOLOGY

## 2024-08-20 PROCEDURE — 0KQM0ZZ REPAIR PERINEUM MUSCLE, OPEN APPROACH: ICD-10-PCS | Performed by: OBSTETRICS & GYNECOLOGY

## 2024-08-20 PROCEDURE — 86850 RBC ANTIBODY SCREEN: CPT | Performed by: OBSTETRICS & GYNECOLOGY

## 2024-08-20 PROCEDURE — 88307 TISSUE EXAM BY PATHOLOGIST: CPT | Performed by: OBSTETRICS & GYNECOLOGY

## 2024-08-20 PROCEDURE — C1755 CATHETER, INTRASPINAL: HCPCS | Performed by: NURSE ANESTHETIST, CERTIFIED REGISTERED

## 2024-08-20 PROCEDURE — 25810000003 LACTATED RINGERS PER 1000 ML: Performed by: OBSTETRICS & GYNECOLOGY

## 2024-08-20 RX ORDER — MORPHINE SULFATE 2 MG/ML
2 INJECTION, SOLUTION INTRAMUSCULAR; INTRAVENOUS
Status: DISCONTINUED | OUTPATIENT
Start: 2024-08-20 | End: 2024-08-20 | Stop reason: HOSPADM

## 2024-08-20 RX ORDER — ACETAMINOPHEN 325 MG/1
650 TABLET ORAL EVERY 6 HOURS PRN
Status: DISCONTINUED | OUTPATIENT
Start: 2024-08-20 | End: 2024-08-22 | Stop reason: HOSPADM

## 2024-08-20 RX ORDER — ACETAMINOPHEN 325 MG/1
650 TABLET ORAL EVERY 4 HOURS PRN
Status: DISCONTINUED | OUTPATIENT
Start: 2024-08-20 | End: 2024-08-20 | Stop reason: HOSPADM

## 2024-08-20 RX ORDER — PROMETHAZINE HYDROCHLORIDE 25 MG/1
25 TABLET ORAL EVERY 6 HOURS PRN
Status: DISCONTINUED | OUTPATIENT
Start: 2024-08-20 | End: 2024-08-22 | Stop reason: HOSPADM

## 2024-08-20 RX ORDER — MISOPROSTOL 200 UG/1
800 TABLET ORAL ONCE
Status: DISCONTINUED | OUTPATIENT
Start: 2024-08-20 | End: 2024-08-20 | Stop reason: HOSPADM

## 2024-08-20 RX ORDER — TRAMADOL HYDROCHLORIDE 50 MG/1
50 TABLET ORAL EVERY 6 HOURS PRN
Status: DISCONTINUED | OUTPATIENT
Start: 2024-08-20 | End: 2024-08-22 | Stop reason: HOSPADM

## 2024-08-20 RX ORDER — CARBOPROST TROMETHAMINE 250 UG/ML
250 INJECTION, SOLUTION INTRAMUSCULAR AS NEEDED
Status: DISCONTINUED | OUTPATIENT
Start: 2024-08-20 | End: 2024-08-20 | Stop reason: HOSPADM

## 2024-08-20 RX ORDER — OXYTOCIN/0.9 % SODIUM CHLORIDE 30/500 ML
125 PLASTIC BAG, INJECTION (ML) INTRAVENOUS ONCE AS NEEDED
Status: DISCONTINUED | OUTPATIENT
Start: 2024-08-20 | End: 2024-08-22 | Stop reason: HOSPADM

## 2024-08-20 RX ORDER — IBUPROFEN 600 MG/1
600 TABLET, FILM COATED ORAL EVERY 6 HOURS PRN
OUTPATIENT
Start: 2024-08-20

## 2024-08-20 RX ORDER — SODIUM CHLORIDE 9 MG/ML
40 INJECTION, SOLUTION INTRAVENOUS AS NEEDED
Status: DISCONTINUED | OUTPATIENT
Start: 2024-08-20 | End: 2024-08-20 | Stop reason: HOSPADM

## 2024-08-20 RX ORDER — ROPIVACAINE HYDROCHLORIDE 2 MG/ML
INJECTION, SOLUTION EPIDURAL; INFILTRATION; PERINEURAL AS NEEDED
Status: DISCONTINUED | OUTPATIENT
Start: 2024-08-20 | End: 2024-08-20 | Stop reason: SURG

## 2024-08-20 RX ORDER — ONDANSETRON 4 MG/1
4 TABLET, ORALLY DISINTEGRATING ORAL EVERY 8 HOURS PRN
Status: DISCONTINUED | OUTPATIENT
Start: 2024-08-20 | End: 2024-08-22 | Stop reason: HOSPADM

## 2024-08-20 RX ORDER — CITRIC ACID/SODIUM CITRATE 334-500MG
30 SOLUTION, ORAL ORAL ONCE AS NEEDED
Status: DISCONTINUED | OUTPATIENT
Start: 2024-08-20 | End: 2024-08-20 | Stop reason: HOSPADM

## 2024-08-20 RX ORDER — CARBOPROST TROMETHAMINE 250 UG/ML
250 INJECTION, SOLUTION INTRAMUSCULAR ONCE
Status: DISCONTINUED | OUTPATIENT
Start: 2024-08-20 | End: 2024-08-22 | Stop reason: HOSPADM

## 2024-08-20 RX ORDER — METHYLERGONOVINE MALEATE 0.2 MG/ML
200 INJECTION INTRAVENOUS ONCE AS NEEDED
Status: DISCONTINUED | OUTPATIENT
Start: 2024-08-20 | End: 2024-08-20 | Stop reason: HOSPADM

## 2024-08-20 RX ORDER — CALCIUM CARBONATE 500 MG/1
2 TABLET, CHEWABLE ORAL 3 TIMES DAILY PRN
Status: DISCONTINUED | OUTPATIENT
Start: 2024-08-20 | End: 2024-08-22 | Stop reason: HOSPADM

## 2024-08-20 RX ORDER — ONDANSETRON 4 MG/1
4 TABLET, ORALLY DISINTEGRATING ORAL EVERY 6 HOURS PRN
Status: DISCONTINUED | OUTPATIENT
Start: 2024-08-20 | End: 2024-08-20 | Stop reason: HOSPADM

## 2024-08-20 RX ORDER — TERBUTALINE SULFATE 1 MG/ML
0.25 INJECTION, SOLUTION SUBCUTANEOUS AS NEEDED
Status: DISCONTINUED | OUTPATIENT
Start: 2024-08-20 | End: 2024-08-20 | Stop reason: HOSPADM

## 2024-08-20 RX ORDER — SODIUM CHLORIDE 0.9 % (FLUSH) 0.9 %
10 SYRINGE (ML) INJECTION AS NEEDED
Status: DISCONTINUED | OUTPATIENT
Start: 2024-08-20 | End: 2024-08-20 | Stop reason: HOSPADM

## 2024-08-20 RX ORDER — SODIUM CHLORIDE 0.9 % (FLUSH) 0.9 %
10 SYRINGE (ML) INJECTION EVERY 12 HOURS SCHEDULED
Status: DISCONTINUED | OUTPATIENT
Start: 2024-08-20 | End: 2024-08-20 | Stop reason: HOSPADM

## 2024-08-20 RX ORDER — CALCIUM CARBONATE 500 MG/1
2 TABLET, CHEWABLE ORAL 3 TIMES DAILY PRN
OUTPATIENT
Start: 2024-08-20

## 2024-08-20 RX ORDER — SODIUM CHLORIDE, SODIUM LACTATE, POTASSIUM CHLORIDE, CALCIUM CHLORIDE 600; 310; 30; 20 MG/100ML; MG/100ML; MG/100ML; MG/100ML
125 INJECTION, SOLUTION INTRAVENOUS CONTINUOUS
Status: DISCONTINUED | OUTPATIENT
Start: 2024-08-20 | End: 2024-08-20

## 2024-08-20 RX ORDER — HYDROCORTISONE 25 MG/G
1 CREAM TOPICAL AS NEEDED
Status: DISCONTINUED | OUTPATIENT
Start: 2024-08-20 | End: 2024-08-22 | Stop reason: HOSPADM

## 2024-08-20 RX ORDER — LIDOCAINE HYDROCHLORIDE AND EPINEPHRINE 15; 5 MG/ML; UG/ML
INJECTION, SOLUTION EPIDURAL
Status: COMPLETED | OUTPATIENT
Start: 2024-08-20 | End: 2024-08-20

## 2024-08-20 RX ORDER — ONDANSETRON 4 MG/1
4 TABLET, ORALLY DISINTEGRATING ORAL EVERY 6 HOURS PRN
OUTPATIENT
Start: 2024-08-20

## 2024-08-20 RX ORDER — LIDOCAINE HYDROCHLORIDE 20 MG/ML
20 INJECTION, SOLUTION INFILTRATION; PERINEURAL ONCE
Status: DISCONTINUED | OUTPATIENT
Start: 2024-08-20 | End: 2024-08-20

## 2024-08-20 RX ORDER — METHYLERGONOVINE MALEATE 0.2 MG/ML
200 INJECTION INTRAVENOUS ONCE
Status: DISCONTINUED | OUTPATIENT
Start: 2024-08-20 | End: 2024-08-22 | Stop reason: HOSPADM

## 2024-08-20 RX ORDER — IBUPROFEN 600 MG/1
600 TABLET, FILM COATED ORAL EVERY 6 HOURS PRN
Status: DISCONTINUED | OUTPATIENT
Start: 2024-08-20 | End: 2024-08-22 | Stop reason: HOSPADM

## 2024-08-20 RX ORDER — OXYTOCIN/0.9 % SODIUM CHLORIDE 30/500 ML
250 PLASTIC BAG, INJECTION (ML) INTRAVENOUS CONTINUOUS
Status: ACTIVE | OUTPATIENT
Start: 2024-08-20 | End: 2024-08-20

## 2024-08-20 RX ORDER — DOCUSATE SODIUM 100 MG/1
100 CAPSULE, LIQUID FILLED ORAL 2 TIMES DAILY
Status: DISCONTINUED | OUTPATIENT
Start: 2024-08-20 | End: 2024-08-22 | Stop reason: HOSPADM

## 2024-08-20 RX ORDER — MISOPROSTOL 200 UG/1
600 TABLET ORAL ONCE
Status: DISCONTINUED | OUTPATIENT
Start: 2024-08-20 | End: 2024-08-22 | Stop reason: HOSPADM

## 2024-08-20 RX ORDER — BISACODYL 10 MG
10 SUPPOSITORY, RECTAL RECTAL DAILY PRN
Status: DISCONTINUED | OUTPATIENT
Start: 2024-08-21 | End: 2024-08-22 | Stop reason: HOSPADM

## 2024-08-20 RX ORDER — OXYTOCIN/0.9 % SODIUM CHLORIDE 30/500 ML
999 PLASTIC BAG, INJECTION (ML) INTRAVENOUS ONCE
Status: COMPLETED | OUTPATIENT
Start: 2024-08-20 | End: 2024-08-20

## 2024-08-20 RX ORDER — ONDANSETRON 2 MG/ML
4 INJECTION INTRAMUSCULAR; INTRAVENOUS EVERY 6 HOURS PRN
OUTPATIENT
Start: 2024-08-20

## 2024-08-20 RX ORDER — NALOXONE HCL 0.4 MG/ML
0.4 VIAL (ML) INJECTION
Status: DISCONTINUED | OUTPATIENT
Start: 2024-08-20 | End: 2024-08-22 | Stop reason: HOSPADM

## 2024-08-20 RX ORDER — MORPHINE SULFATE 2 MG/ML
1 INJECTION, SOLUTION INTRAMUSCULAR; INTRAVENOUS EVERY 4 HOURS PRN
Status: DISCONTINUED | OUTPATIENT
Start: 2024-08-20 | End: 2024-08-21

## 2024-08-20 RX ORDER — PROMETHAZINE HYDROCHLORIDE 12.5 MG/1
12.5 SUPPOSITORY RECTAL EVERY 6 HOURS PRN
Status: DISCONTINUED | OUTPATIENT
Start: 2024-08-20 | End: 2024-08-22 | Stop reason: HOSPADM

## 2024-08-20 RX ORDER — ONDANSETRON 2 MG/ML
4 INJECTION INTRAMUSCULAR; INTRAVENOUS EVERY 6 HOURS PRN
Status: DISCONTINUED | OUTPATIENT
Start: 2024-08-20 | End: 2024-08-22 | Stop reason: HOSPADM

## 2024-08-20 RX ORDER — SODIUM CHLORIDE 0.9 % (FLUSH) 0.9 %
1-10 SYRINGE (ML) INJECTION AS NEEDED
Status: DISCONTINUED | OUTPATIENT
Start: 2024-08-20 | End: 2024-08-22 | Stop reason: HOSPADM

## 2024-08-20 RX ORDER — ONDANSETRON 2 MG/ML
4 INJECTION INTRAMUSCULAR; INTRAVENOUS EVERY 6 HOURS PRN
Status: DISCONTINUED | OUTPATIENT
Start: 2024-08-20 | End: 2024-08-20 | Stop reason: HOSPADM

## 2024-08-20 RX ADMIN — SODIUM CHLORIDE, POTASSIUM CHLORIDE, SODIUM LACTATE AND CALCIUM CHLORIDE 125 ML/HR: 600; 310; 30; 20 INJECTION, SOLUTION INTRAVENOUS at 06:00

## 2024-08-20 RX ADMIN — MORPHINE SULFATE 2 MG: 2 INJECTION, SOLUTION INTRAMUSCULAR; INTRAVENOUS at 05:06

## 2024-08-20 RX ADMIN — SODIUM CHLORIDE, POTASSIUM CHLORIDE, SODIUM LACTATE AND CALCIUM CHLORIDE 1000 ML: 600; 310; 30; 20 INJECTION, SOLUTION INTRAVENOUS at 04:56

## 2024-08-20 RX ADMIN — DOCUSATE SODIUM 100 MG: 100 CAPSULE, LIQUID FILLED ORAL at 20:21

## 2024-08-20 RX ADMIN — Medication 999 ML/HR: at 13:25

## 2024-08-20 RX ADMIN — ROPIVACAINE HYDROCHLORIDE 5 ML: 2 INJECTION, SOLUTION EPIDURAL; INFILTRATION at 05:27

## 2024-08-20 RX ADMIN — ROPIVACAINE HYDROCHLORIDE 6 ML/HR: 2 INJECTION, SOLUTION EPIDURAL; INFILTRATION at 05:27

## 2024-08-20 RX ADMIN — Medication 10 ML: at 20:22

## 2024-08-20 RX ADMIN — BENZOCAINE AND LEVOMENTHOL: 200; 5 SPRAY TOPICAL at 16:58

## 2024-08-20 RX ADMIN — LIDOCAINE HYDROCHLORIDE AND EPINEPHRINE 3 ML: 15; 5 INJECTION, SOLUTION EPIDURAL at 05:25

## 2024-08-20 NOTE — H&P
[facility]  Felicity Fuller  : 2005  MRN: [id number]  CSN: [id number]    History and Physical    Subjective   Felicity Fuller is a 19 y.o. year old  with an Estimated Date of Delivery: 24 currently at 39w3d presenting with regular contractions during the night.  Patient has SROM with clear fluid after arriving, at around 0200.  She is now comfortable with her epidural.    Prenatal care has been with Dr. Zaria James.  It has been benign except for mild anemia; Hgb at 28 weeks was 9.1.  MFM anatomy scan was normal.  No additional imaging was recommended.  EFW 41% at 24 weeks.    OB History    Para Term  AB Living   1 0 0 0 0 0   SAB IAB Ectopic Molar Multiple Live Births   0 0 0 0 0 0      # Outcome Date GA Lbr Magan/2nd Weight Sex Type Anes PTL Lv   1 Current              Past Medical History:   Diagnosis Date    Chronic headaches 2019    Headache     POTS (postural orthostatic tachycardia syndrome)      Past Surgical History:   Procedure Laterality Date    ENDOSCOPY N/A 2021    Procedure: ESOPHAGOGASTRODUODENOSCOPY WITH ANESTHESIA;  Surgeon: Teresa Chamberlain MD;  Location: [facility] ENDOSCOPY;  Service: Gastroenterology;  Laterality: N/A;  preop; GERD  postop; esophagitis   PCP Tono Burnett     EXTERNAL EAR SURGERY         Current Facility-Administered Medications:     acetaminophen (TYLENOL) tablet 650 mg, 650 mg, Oral, Q4H PRN, Grzegorz Hernandez MD    carboprost (HEMABATE) injection 250 mcg, 250 mcg, Intramuscular, PRN, Grzegorz Hernandez MD    lactated ringers infusion, 125 mL/hr, Intravenous, Continuous, Grzegorz Hernandez MD, Last Rate: 125 mL/hr at 24 0600, 125 mL/hr at 24 0600    methylergonovine (METHERGINE) injection 200 mcg, 200 mcg, Intramuscular, Once PRN, Grzegorz Hernandez MD    miSOPROStol (CYTOTEC) tablet 800 mcg, 800 mcg, Rectal, Once, Grzegorz Hernandez MD    Morphine sulfate (PF) injection 2 mg, 2 mg, Intravenous, Q2H PRN, Grzegorz Hernandez MD, 2  mg at 24 0506    Morphine sulfate (PF) injection 2 mg, 2 mg, Intravenous, Q2H PRN, Grzegorz Hernandez MD    ondansetron ODT (ZOFRAN-ODT) disintegrating tablet 4 mg, 4 mg, Oral, Q6H PRN **OR** ondansetron (ZOFRAN) injection 4 mg, 4 mg, Intravenous, Q6H PRN, Grzegorz Hernandez MD    oxytocin (PITOCIN) 30 units in 0.9% sodium chloride 500 mL (premix), 999 mL/hr, Intravenous, Once, Held at 24 0737 **FOLLOWED BY** [] oxytocin (PITOCIN) 30 units in 0.9% sodium chloride 500 mL (premix), 250 mL/hr, Intravenous, Continuous, Grzegorz Hernandez MD, Held at 24 0738    Sod Citrate-Citric Acid (BICITRA) oral solution 30 mL, 30 mL, Oral, Once PRN, Grzegorz Hernandez MD    sodium chloride 0.9 % flush 10 mL, 10 mL, Intravenous, Q12H, Grzegorz Hernandez MD    sodium chloride 0.9 % flush 10 mL, 10 mL, Intravenous, PRN, Grzegorz Hernandez MD    sodium chloride 0.9 % infusion 40 mL, 40 mL, Intravenous, PRN, Grzegorz Hernandez MD    terbutaline (BRETHINE) injection 0.25 mg, 0.25 mg, Subcutaneous, PRN, Grzegorz Hernandez MD    Facility-Administered Medications Ordered in Other Encounters:     dexmedetomidine (PRECEDEX) 20 mcg/5 mL Syringe, , Intravenous, PRN, Domingo Fairchild CRNA, 20 mcg at 24 0525    ropivacaine (NAROPIN) 0.2 % injection, , Epidural, PRN, Domingo Fairchild CRNA, 5 mL at 24 05    ropivacaine (NAROPIN) 200 mg in 100 mL epidural, , Epidural, Continuous PRN, Domingo Fairchild CRNA, Last Rate: 6 mL/hr at 24 0527, 6 mL/hr at 24 05    Allergies   Allergen Reactions    Codeine Irritability     Social History    Tobacco Use      Smoking status: Never      Smokeless tobacco: Never    Review of Systems   Constitutional:  Positive for activity change. Negative for unexpected weight change.   Respiratory:  Negative for shortness of breath.    Cardiovascular:  Negative for chest pain.   Gastrointestinal:  Positive for abdominal pain (prior to epidrual).   Genitourinary:  Positive for vaginal discharge  "(clear LOF). Negative for vaginal bleeding.         Objective   /70   Pulse 81   Temp 97.9 °F (36.6 °C) (Oral)   Resp 16   Ht 170.2 cm (67\")   Wt 85.3 kg (188 lb)   LMP 11/01/2023   SpO2 100%   Breastfeeding No   BMI 29.44 kg/m²   General: well developed; well nourished  no acute distress   Heart: Not performed.   Lungs: breathing is unlabored   Abdomen: soft, non-tender; no masses   FHT's: reactive, reassuring   Cervix: was checked (by me): 5-6 cm / 100 % / -1   Presentation: cephalic   Contractions:  EFW by Leopold's:  EFW by recent u/s: regular           Prenatal Labs  Lab Results   Component Value Date    HGB 8.8 (L) 08/20/2024    HEPBSAG Negative 01/08/2024    ABO O 08/20/2024    RH Positive 08/20/2024    ABSCRN Negative 08/20/2024    ADC4XEG4 non-reactive 01/08/2024    HEPCVIRUSABY non-reactive 01/08/2024    URINECX No growth 06/06/2024       Current Labs Reviewed   CBC       Assessment   IUP at 39w3d who presented in spontaneous labor.  SROM occurred at 0200  Fetus reassuring  Group B strep status: negative  Comfortable with epidural     Plan   Expectant management  Will start pitocin only if labor stalls    Zaria James MD  8/20/2024  08:54 CDT     "

## 2024-08-20 NOTE — PAYOR COMM NOTE
"ADMIT INPT 24  UR  [id number]    Arron Fuller (19 y.o. Female)       Date of Birth   2005    Social Security Number       Address   [address] [address]    Home Phone   [phone]    MRN   [id number]       Holiness   Mosque    Marital Status   Single                            Admission Date   24    Admission Type   Elective    Admitting Provider   Grzegorz Hernandez MD    Attending Provider   Zaria James MD    Department, Room/Bed   [facility] MOTHER BABY 2A, M239/1       Discharge Date       Discharge Disposition       Discharge Destination                                 Attending Provider: Zaria James MD    Allergies: Codeine    Isolation: None   Infection: None   Code Status: CPR    Ht: 170.2 cm (67\")   Wt: 85.3 kg (188 lb)    Admission Cmt: None   Principal Problem: Pregnancy [Z34.90]                   Active Insurance as of 2024       Primary Coverage       Payor Plan Insurance Group Employer/Plan Group    [employer] [place] [employer] [place]        Payor Plan Address Payor Plan Phone Number Payor Plan Fax Number Effective Dates    [address] [address] [phone]  3/12/2017 - None Entered    [facility] [address]         Subscriber Name Subscriber Birth Date Member ID       ARRON FULLER 2005 [id number]                     Emergency Contacts        (Rel.) Home Phone Work Phone Mobile Phone    Angela Ariza (Mother) [phone] -- --    ROMAIN ARIZA (Father) [phone] -- --                 History & Physical        Zaria James MD at 24 0854          [facility]  Arron Fuller  : 2005  MRN: [id number]  CSN: [id number]    History and Physical    Subjective  Arron Fuller is a 19 y.o. year old  with an Estimated Date of Delivery: 24 currently at 39w3d presenting with regular contractions during the night.  Patient has SROM with clear fluid after arriving, at " around 0200.  She is now comfortable with her epidural.    Prenatal care has been with Dr. Zaria James.  It has been benign except for mild anemia; Hgb at 28 weeks was 9.1.  MFM anatomy scan was normal.  No additional imaging was recommended.  EFW 41% at 24 weeks.    OB History    Para Term  AB Living   1 0 0 0 0 0   SAB IAB Ectopic Molar Multiple Live Births   0 0 0 0 0 0      # Outcome Date GA Lbr Magan/2nd Weight Sex Type Anes PTL Lv   1 Current              Past Medical History:   Diagnosis Date    Chronic headaches 2019    Headache     POTS (postural orthostatic tachycardia syndrome)      Past Surgical History:   Procedure Laterality Date    ENDOSCOPY N/A 2021    Procedure: ESOPHAGOGASTRODUODENOSCOPY WITH ANESTHESIA;  Surgeon: Teresa Chamberlain MD;  Location: [facility] ENDOSCOPY;  Service: Gastroenterology;  Laterality: N/A;  preop; GERD  postop; esophagitis   PCP Tono Burnett     EXTERNAL EAR SURGERY         Current Facility-Administered Medications:     acetaminophen (TYLENOL) tablet 650 mg, 650 mg, Oral, Q4H PRN, Grzegorz Hernandez MD    carboprost (HEMABATE) injection 250 mcg, 250 mcg, Intramuscular, PRN, Grzegorz Hernandez MD    lactated ringers infusion, 125 mL/hr, Intravenous, Continuous, Grzegorz Hernandez MD, Last Rate: 125 mL/hr at 24 0600, 125 mL/hr at 24 0600    methylergonovine (METHERGINE) injection 200 mcg, 200 mcg, Intramuscular, Once PRN, Grzegorz Hernandez MD    miSOPROStol (CYTOTEC) tablet 800 mcg, 800 mcg, Rectal, Once, Grzegorz Hernandez MD    Morphine sulfate (PF) injection 2 mg, 2 mg, Intravenous, Q2H PRN, Grzegorz Hernandez MD, 2 mg at 24 0506    Morphine sulfate (PF) injection 2 mg, 2 mg, Intravenous, Q2H PRN, Grzegorz Hernandez MD    ondansetron ODT (ZOFRAN-ODT) disintegrating tablet 4 mg, 4 mg, Oral, Q6H PRN **OR** ondansetron (ZOFRAN) injection 4 mg, 4 mg, Intravenous, Q6H PRN, Grzegorz Hernandez MD    oxytocin (PITOCIN) 30 units in 0.9% sodium chloride 500 mL (premix),  "999 mL/hr, Intravenous, Once, Held at 24 0737 **FOLLOWED BY** [] oxytocin (PITOCIN) 30 units in 0.9% sodium chloride 500 mL (premix), 250 mL/hr, Intravenous, Continuous, Grzegorz Hernandez MD, Held at 24 0738    Sod Citrate-Citric Acid (BICITRA) oral solution 30 mL, 30 mL, Oral, Once PRN, Grzegorz Hernandez MD    sodium chloride 0.9 % flush 10 mL, 10 mL, Intravenous, Q12H, Grzegorz Hernandez MD    sodium chloride 0.9 % flush 10 mL, 10 mL, Intravenous, PRN, Grzegorz Hernandez MD    sodium chloride 0.9 % infusion 40 mL, 40 mL, Intravenous, PRN, Grzegorz Hernandez MD    terbutaline (BRETHINE) injection 0.25 mg, 0.25 mg, Subcutaneous, PRN, Grzegorz Hernandez MD    Facility-Administered Medications Ordered in Other Encounters:     dexmedetomidine (PRECEDEX) 20 mcg/5 mL Syringe, , Intravenous, PRN, Domingo Fairchild CRNA, 20 mcg at 24 0525    ropivacaine (NAROPIN) 0.2 % injection, , Epidural, PRN, Domingo Fairchild CRNA, 5 mL at 24 05    ropivacaine (NAROPIN) 200 mg in 100 mL epidural, , Epidural, Continuous PRN, Domingo Fairchild CRNA, Last Rate: 6 mL/hr at 24 0527, 6 mL/hr at 24 05    Allergies   Allergen Reactions    Codeine Irritability     Social History    Tobacco Use      Smoking status: Never      Smokeless tobacco: Never    Review of Systems   Constitutional:  Positive for activity change. Negative for unexpected weight change.   Respiratory:  Negative for shortness of breath.    Cardiovascular:  Negative for chest pain.   Gastrointestinal:  Positive for abdominal pain (prior to epidrual).   Genitourinary:  Positive for vaginal discharge (clear LOF). Negative for vaginal bleeding.         Objective  /70   Pulse 81   Temp 97.9 °F (36.6 °C) (Oral)   Resp 16   Ht 170.2 cm (67\")   Wt 85.3 kg (188 lb)   LMP 2023   SpO2 100%   Breastfeeding No   BMI 29.44 kg/m²   General: well developed; well nourished  no acute distress   Heart: Not performed.   Lungs: breathing is " unlabored   Abdomen: soft, non-tender; no masses   FHT's: reactive, reassuring   Cervix: was checked (by me): 5-6 cm / 100 % / -1   Presentation: cephalic   Contractions:  EFW by Leopold's:  EFW by recent u/s: regular           Prenatal Labs  Lab Results   Component Value Date    HGB 8.8 (L) 2024    HEPBSAG Negative 2024    ABO O 2024    RH Positive 2024    ABSCRN Negative 2024    RJT4PML6 non-reactive 2024    HEPCVIRUSABY non-reactive 2024    URINECX No growth 2024       Current Labs Reviewed   CBC       Assessment  IUP at 39w3d who presented in spontaneous labor.  SROM occurred at 0200  Fetus reassuring  Group B strep status: negative  Comfortable with epidural     Plan  Expectant management  Will start pitocin only if labor stalls    Zaria James MD  2024  08:54 CDT       Electronically signed by Zaria James MD at 24 0858          Operative/Procedure Notes (all)        Zaria James MD at 24 1335  Version 1 of [address] [place] [address]  Vaginal Delivery Note   Review the Delivery Report for details.       Delivery     Delivery: Vaginal, Spontaneous     YOB: 2026    Time of Birth:  Gestational Age 1:22 PM   39w3d     Anesthesia: Epidural     Delivering clinician: Zaria James    Forceps?   No   Vacuum? No    Shoulder dystocia present: No        Delivery narrative:  Patient pushed very effectively.   over 2nd degree MLL.  Viable female infant delivered, OA, and was immediately vigorous.  Cord clamping delayed by 60 seconds.  Baby allowed to transition on mom's abdomen.  Cord gases collected.  Spontaneous placenta, grossly intact.  Repair with 3-0 vicryl rapide.  No other abrasions or lacerations.  Count confirmed with CST.    Infant    Findings: female  infant     Infant observations: Weight: No birth weight on file.   Length:   in  Observations/Comments:        Apgars:   @ 1 minute /      @ 5 minutes   Infant Name: Toyin Burk  "Hemalatha     Placenta, Cord, and Fluid    Placenta delivered  Spontaneous  at   8/20/2024  1:25 PM     Cord:   present.   Nuchal Cord?  no   Cord blood obtained: Yes    Cord gases obtained:  Yes    Cord gas results: Venous:  No results found for: \"PHCVEN\", \"BECVEN\"    Arterial:  No results found for: \"PHCART\", \"BECART\"     Repair    Episiotomy: None     No    Lacerations: Yes  Laceration Information  Laceration Repaired?   Perineal: 2nd  Yes    Periurethral:       Labial:       Sulcus:       Vaginal:       Cervical:         Suture used for repair: 3-0 Vicryl  Laceration Length for 3rd or 4th degree lacerations: N/A   Estimated Blood Loss:  120 ml             Quantitative Blood Loss:                  Complications  none    Disposition  Mother to Mother Baby/Postpartum  in stable condition currently.  Baby to remains with mom  in stable condition currently.      Zaria James MD  08/20/24  13:36 CDT          Electronically signed by Zaria James MD at 08/20/24 1338       Physician Progress Notes (last 72 hours)  Notes from 08/17/24 1823 through 08/20/24 1823   No notes of this type exist for this encounter.       "

## 2024-08-20 NOTE — ANESTHESIA PROCEDURE NOTES
Labor Epidural      Patient reassessed immediately prior to procedure    Patient location during procedure: OB  Indication:at surgeon's request  Performed By  CRNA/CAA: Domingo Fairchild CRNA  Preanesthetic Checklist  Completed: patient identified, IV checked, site marked, risks and benefits discussed, surgical consent, monitors and equipment checked, pre-op evaluation and timeout performed  Prep:  Pt Position:sitting  Sterile Tech:cap, gloves, mask and sterile barrier  Prep:DuraPrep  Monitoring:blood pressure monitoring and continuous pulse oximetry  Epidural Block Procedure:  Approach:midline  Guidance:palpation technique  Location:L3-L4  Needle Type:Tuohy  Needle Gauge:18 G  Loss of Resistance Medium: saline  Loss of Resistance: 7cm  Cath Depth at skin:14 cm  Paresthesia: none  Aspiration:negative  Test Dose:negative  Medication: lidocaine 1.5%-EPINEPHrine 1:200,000 (XYLOCAINE W/EPI) injection - Injection   3 mL - 8/20/2024 5:25:00 AM  Number of Attempts: 1  Post Assessment:  Dressing:occlusive dressing applied and secured with tape  Pt Tolerance:patient tolerated the procedure well with no apparent complications  Complications:no

## 2024-08-20 NOTE — PLAN OF CARE
Goal Outcome Evaluation:  Plan of Care Reviewed With: patient        Progress: improving  Outcome Evaluation: , 39w3d, Spontaneous onset of labor, GBS-, allergic to codeine.  at 1322, 2nd degree laceration. PP pitocin bolus administered, PP Pitocin infusing at 125 mL/hr. Pt voided at 1430 and cleaned up. FF/ML/UU/Scant bleeding, no complaints at this time. VSS.

## 2024-08-20 NOTE — ANESTHESIA PREPROCEDURE EVALUATION
Anesthesia Evaluation     Patient summary reviewed and Nursing notes reviewed                Airway   Mallampati: I  TM distance: >3 FB  Neck ROM: full  No difficulty expected  Dental - normal exam     Pulmonary - negative pulmonary ROS and normal exam   Cardiovascular - negative cardio ROS and normal exam  Exercise tolerance: good (4-7 METS)        Neuro/Psych- negative ROS  GI/Hepatic/Renal/Endo - negative ROS     Musculoskeletal (-) negative ROS    Abdominal  - normal exam    Bowel sounds: normal.   Substance History - negative use     OB/GYN negative ob/gyn ROS         Other - negative ROS       ROS/Med Hx Other: Pt has POTS              Anesthesia Plan    ASA 2     epidural       Anesthetic plan, risks, benefits, and alternatives have been provided, discussed and informed consent has been obtained with: patient.    CODE STATUS:    Code Status (Patient has no pulse and is not breathing): CPR (Attempt to Resuscitate)  Medical Interventions (Patient has pulse or is breathing): Full Support

## 2024-08-20 NOTE — L&D DELIVERY NOTE
"[facility]  Vaginal Delivery Note   Review the Delivery Report for details.       Delivery     Delivery: Vaginal, Spontaneous     YOB: 2026    Time of Birth:  Gestational Age 1:22 PM   39w3d     Anesthesia: Epidural     Delivering clinician: Zaria James    Forceps?   No   Vacuum? No    Shoulder dystocia present: No        Delivery narrative:  Patient pushed very effectively.   over 2nd degree MLL.  Viable female infant delivered, OA, and was immediately vigorous.  Cord clamping delayed by 60 seconds.  Baby allowed to transition on mom's abdomen.  Cord gases collected.  Spontaneous placenta, grossly intact.  Repair with 3-0 vicryl rapide.  No other abrasions or lacerations.  Count confirmed with CST.    Infant    Findings: female  infant     Infant observations: Weight: No birth weight on file.   Length:   in  Observations/Comments:        Apgars:   @ 1 minute /      @ 5 minutes   Infant Name: Toyin Penny     Placenta, Cord, and Fluid    Placenta delivered  Spontaneous  at   2024  1:25 PM     Cord:   present.   Nuchal Cord?  no   Cord blood obtained: Yes    Cord gases obtained:  Yes    Cord gas results: Venous:  No results found for: \"PHCVEN\", \"BECVEN\"    Arterial:  No results found for: \"PHCART\", \"BECART\"     Repair    Episiotomy: None     No    Lacerations: Yes  Laceration Information  Laceration Repaired?   Perineal: 2nd  Yes    Periurethral:       Labial:       Sulcus:       Vaginal:       Cervical:         Suture used for repair: 3-0 Vicryl  Laceration Length for 3rd or 4th degree lacerations: N/A   Estimated Blood Loss:  120 ml             Quantitative Blood Loss:                  Complications  none    Disposition  Mother to Mother Baby/Postpartum  in stable condition currently.  Baby to remains with mom  in stable condition currently.      Zaria James MD  24  13:36 CDT        "

## 2024-08-21 LAB
BASOPHILS # BLD AUTO: 0.06 10*3/MM3 (ref 0–0.2)
BASOPHILS NFR BLD AUTO: 0.3 % (ref 0–1.5)
DEPRECATED RDW RBC AUTO: 42.7 FL (ref 37–54)
EOSINOPHIL # BLD AUTO: 0.03 10*3/MM3 (ref 0–0.4)
EOSINOPHIL NFR BLD AUTO: 0.1 % (ref 0.3–6.2)
ERYTHROCYTE [DISTWIDTH] IN BLOOD BY AUTOMATED COUNT: 14.8 % (ref 12.3–15.4)
HCT VFR BLD AUTO: 25.7 % (ref 34–46.6)
HGB BLD-MCNC: 7.7 G/DL (ref 12–15.9)
IMM GRANULOCYTES # BLD AUTO: 0.3 10*3/MM3 (ref 0–0.05)
IMM GRANULOCYTES NFR BLD AUTO: 1.4 % (ref 0–0.5)
LYMPHOCYTES # BLD AUTO: 3.01 10*3/MM3 (ref 0.7–3.1)
LYMPHOCYTES NFR BLD AUTO: 13.9 % (ref 19.6–45.3)
MCH RBC QN AUTO: 23.7 PG (ref 26.6–33)
MCHC RBC AUTO-ENTMCNC: 30 G/DL (ref 31.5–35.7)
MCV RBC AUTO: 79.1 FL (ref 79–97)
MONOCYTES # BLD AUTO: 1.45 10*3/MM3 (ref 0.1–0.9)
MONOCYTES NFR BLD AUTO: 6.7 % (ref 5–12)
NEUTROPHILS NFR BLD AUTO: 16.87 10*3/MM3 (ref 1.7–7)
NEUTROPHILS NFR BLD AUTO: 77.6 % (ref 42.7–76)
NRBC BLD AUTO-RTO: 0.1 /100 WBC (ref 0–0.2)
PLATELET # BLD AUTO: 268 10*3/MM3 (ref 140–450)
PMV BLD AUTO: 10.2 FL (ref 6–12)
RBC # BLD AUTO: 3.25 10*6/MM3 (ref 3.77–5.28)
WBC NRBC COR # BLD AUTO: 21.72 10*3/MM3 (ref 3.4–10.8)

## 2024-08-21 PROCEDURE — 0503F POSTPARTUM CARE VISIT: CPT | Performed by: ADVANCED PRACTICE MIDWIFE

## 2024-08-21 PROCEDURE — 85025 COMPLETE CBC W/AUTO DIFF WBC: CPT | Performed by: OBSTETRICS & GYNECOLOGY

## 2024-08-21 RX ADMIN — ACETAMINOPHEN 650 MG: 325 TABLET ORAL at 04:21

## 2024-08-21 RX ADMIN — HYDROCORTISONE 2.5% 1 APPLICATION: 25 CREAM TOPICAL at 20:25

## 2024-08-21 RX ADMIN — BENZOCAINE AND LEVOMENTHOL: 200; 5 SPRAY TOPICAL at 20:25

## 2024-08-21 RX ADMIN — DOCUSATE SODIUM 100 MG: 100 CAPSULE, LIQUID FILLED ORAL at 20:25

## 2024-08-21 RX ADMIN — DOCUSATE SODIUM 100 MG: 100 CAPSULE, LIQUID FILLED ORAL at 09:29

## 2024-08-21 NOTE — PLAN OF CARE
Goal Outcome Evaluation:  Plan of Care Reviewed With: patient, spouse        Progress: improving  Outcome Evaluation: VSS. FF,ML,UU, scant. Using comfort products. PRN meds offered for pain but patient has refused. Ambulating, voiding, passing gas. Bonding well with infant.

## 2024-08-21 NOTE — ANESTHESIA POSTPROCEDURE EVALUATION
"Patient: Felicity Fuller    Procedure Summary       Date: 08/20/24 Room / Location:     Anesthesia Start: 0519 Anesthesia Stop: 1322    Procedure: LABOR ANALGESIA Diagnosis:     Scheduled Providers:  Provider: Domingo Fairchild CRNA    Anesthesia Type: epidural ASA Status: 2            Anesthesia Type: epidural    Vitals  Vitals Value Taken Time   /71 08/21/24 1227   Temp 98.4 °F (36.9 °C) 08/21/24 1227   Pulse 102 08/21/24 1227   Resp 16 08/21/24 1227   SpO2 98 % 08/21/24 1227           Post Anesthesia Care and Evaluation    Patient location during evaluation: bedside  Patient participation: complete - patient participated  Level of consciousness: awake  Pain management: adequate    Airway patency: patent  Anesthetic complications: No anesthetic complications  PONV Status: none  Cardiovascular status: acceptable  Respiratory status: acceptable  Hydration status: acceptable  Post Neuraxial Block status: Motor and sensory function returned to baseline and No signs or symptoms of PDPH  Comments: Blood pressure 114/71, pulse 102, temperature 98.4 °F (36.9 °C), temperature source Oral, resp. rate 16, height 170.2 cm (67\"), weight 85.3 kg (188 lb), last menstrual period 11/01/2023, SpO2 98%, not currently breastfeeding.      "

## 2024-08-21 NOTE — PROGRESS NOTES
"      RENAE Garcia  [facility]  [address]  [place] [address]  Office [phone]  Fax [phone]    [facility]  Vaginal Delivery Progress Note    Subjective   Postpartum Day 1: Vaginal Delivery    She is tired but denies concerns. She has been using Tylenol for discomfort and also has comfort products available. She is ambulating and voiding without difficulty. She declines breastfeeding and has a tight fitting sports bra with her for support for milk suppression. Describes her bleeding as continually getting lighter.     Objective     Vital Signs Range for the last 24 hours  Temperature: Temp:  [97.8 °F (36.6 °C)-99.3 °F (37.4 °C)] 98.1 °F (36.7 °C)   Temp Source: Temp src: Oral   BP: BP: ()/(63-90) 115/72   Pulse: Heart Rate:  [] 89   Respirations: Resp:  [16-18] 16   SPO2: SpO2:  [97 %-100 %] 99 %   O2 Amount (l/min):     O2 Devices Device (Oxygen Therapy): room air   Weight:       Admit Height:  Height: 170.2 cm (67\")      Physical Exam:  General:  no acute distresss.  Abdomen: soft, without significant tenderness. Fundus firm and non-tender  Extremities: normal, atraumatic, no cyanosis, and no edema.       Lab results reviewed:  Yes   Rubella:  No results found for: \"RUBELLAIGGIN\" Nurse Transcribed from prenatal record --  No components found for: \"EXTRUBELQUAL\"  Rh Status:    RH type   Date Value Ref Range Status   08/20/2024 Positive  Corrected     Comment:     PRIOR ABORH RECORDS NOT AVAILABLE FOR VERIFICATION     Immunizations:   Immunization History   Administered Date(s) Administered    DTaP / IPV 08/25/2009    DTaP, Unspecified 2005, 2005, 2005, 07/08/2004, 08/01/2007    Flu Vaccine Quad PF >36MO 12/18/2019, 12/18/2019, 10/19/2020    Flu Vaccine Split Quad 10/19/2020    FluMist 2-49yrs 10/20/2015, 10/20/2015    Fluzone (or Fluarix & Flulaval for VFC) >6mos 10/19/2020    Hep A, 2 Dose 07/30/2018, 07/30/2018, 02/25/2019, 02/25/2019    Hep B / HiB " 2003, 03/29/2004    Hep B, Adolescent or Pediatric 2003    Hib (PRP-OMP) 2003    Hpv9 07/30/2018, 07/30/2018, 02/25/2019, 02/25/2019, 06/07/2019, 06/07/2019    IPV 2005, 2005, 03/29/2004, 08/01/2007    Influenza TIV (IM) 01/10/2008, 09/28/2009    MMR 07/08/2004, 08/01/2007, 08/25/2009    Meningococcal B,(Bexsero) 06/22/2015    Meningococcal MCV4P (Menactra) 06/22/2015, 06/07/2019, 06/07/2019    Tdap 06/22/2015, 05/30/2024    Varicella 03/29/2004, 08/01/2007, 08/25/2009     Lab Results (last 24 hours)       Procedure Component Value Units Date/Time    Tissue Pathology Exam [[id number]] Collected: 08/20/24 1344    Specimen: Tissue from Placenta Updated: 08/21/24 0741    CBC & Differential [[id number]]  (Abnormal) Collected: 08/21/24 0555    Specimen: Blood Updated: 08/21/24 0607    Narrative:      The following orders were created for panel order CBC & Differential.  Procedure                               Abnormality         Status                     ---------                               -----------         ------                     CBC Auto Differential[[id number]]        Abnormal            Final result                 Please view results for these tests on the individual orders.    CBC Auto Differential [[id number]]  (Abnormal) Collected: 08/21/24 0555    Specimen: Blood Updated: 08/21/24 0607     WBC 21.72 10*3/mm3      RBC 3.25 10*6/mm3      Hemoglobin 7.7 g/dL      Hematocrit 25.7 %      MCV 79.1 fL      MCH 23.7 pg      MCHC 30.0 g/dL      RDW 14.8 %      RDW-SD 42.7 fl      MPV 10.2 fL      Platelets 268 10*3/mm3      Neutrophil % 77.6 %      Lymphocyte % 13.9 %      Monocyte % 6.7 %      Eosinophil % 0.1 %      Basophil % 0.3 %      Immature Grans % 1.4 %      Neutrophils, Absolute 16.87 10*3/mm3      Lymphocytes, Absolute 3.01 10*3/mm3      Monocytes, Absolute 1.45 10*3/mm3      Eosinophils, Absolute 0.03 10*3/mm3      Basophils, Absolute 0.06 10*3/mm3      Immature Grans,  Absolute 0.30 10*3/mm3      nRBC 0.1 /100 WBC     Treponema pallidum AB w/Reflex RPR [[id number]]  (Normal) Collected: 08/20/24 0450    Specimen: Blood Updated: 08/20/24 1149     Treponemal AB Total Non-Reactive    Narrative:      Reactive results will reflex RPR testing.            External Prenatal Results       Pregnancy Outside Results - Transcribed From Office Records - See Scanned Records For Details       Test Value Date Time    ABO  O  08/20/24 0450    Rh  Positive  08/20/24 0450    Antibody Screen  Negative  08/20/24 0450      ^ Normal  01/08/24     Varicella IgG ^ non-immune  01/08/24     Rubella ^ immune  01/08/24     Hgb  7.7 g/dL 08/21/24 0555       8.8 g/dL 08/20/24 0450       9.1 g/dL 05/30/24 0902      ^ 11.8 g/dL 01/08/24     Hct  25.7 % 08/21/24 0555       28.8 % 08/20/24 0450      ^ 37 % 01/08/24     HgB A1c        1h GTT  80 mg/dL 05/30/24 0902    3h GTT Fasting       3h GTT 1 hour       3h GTT 2 hour       3h GTT 3 hour        Gonorrhea (discrete)  Negative  08/02/24 1012      ^ not detected  01/08/24     Chlamydia (discrete)  Negative  08/02/24 1012      ^ not detected  01/08/24     RPR  Non Reactive  05/30/24 0902      ^ Non-Reactive  01/08/24     Syphils cascade: TP-Ab (FTA)  Non-Reactive  08/20/24 0450    TP-Ab       TP-Ab (EIA)       TPPA       HBsAg ^ Negative  01/08/24     Herpes Simplex Virus PCR       Herpes Simplex VIrus Culture       HIV ^ non-reactive  01/08/24     Hep C RNA Quant PCR       Hep C Antibody ^ non-reactive  01/08/24     AFP       NIPT       Cystic Fibroisis        Group B Strep  Negative  08/02/24 1012    GBS Susceptibility to Clindamycin       GBS Susceptibility to Erythromycin       Fetal Fibronectin  Negative  05/14/24 0902    Genetic Testing, Maternal Blood                 Drug Screening       Test Value Date Time    Urine Drug Screen       Amphetamine Screen ^ Negative  01/08/24     Barbiturate Screen ^ negative  01/08/24     Benzodiazepine Screen ^ Negative   01/08/24     Methadone Screen ^ Negative  01/08/24     Phencyclidine Screen ^ negative  01/08/24     Opiates Screen ^ Negative  01/08/24     THC Screen ^ Negative  01/08/24     Cocaine Screen ^ negative  01/08/24     Propoxyphene Screen       Buprenorphine Screen       Methamphetamine Screen       Oxycodone Screen       Tricyclic Antidepressants Screen                 Legend    ^: Historical                            Assessment & Plan       * No active hospital problems. *      Felicity Fuller is Day 1  post-partum  Vaginal, Spontaneous   .      Plan:  Continue current postpartum plan of care. Lactation consult for added information regarding lactation suppression.       This note has been signed electronically.    Heydi Darden DNP, APRN, CNM, RNC-OB  8/21/2024  10:28 CDT

## 2024-08-21 NOTE — PLAN OF CARE
Goal Outcome Evaluation:  Plan of Care Reviewed With: patient           Outcome Evaluation: VSS. FF/ML/UU scant lochia. Passed one small stringy clot. Using comfort products for lac. Motrin and Tylenol given prn pain. Ambulating and voiding WNL.  Bonding with infant.

## 2024-08-22 VITALS
HEIGHT: 67 IN | DIASTOLIC BLOOD PRESSURE: 82 MMHG | BODY MASS INDEX: 29.51 KG/M2 | SYSTOLIC BLOOD PRESSURE: 111 MMHG | WEIGHT: 188 LBS | HEART RATE: 95 BPM | TEMPERATURE: 97.7 F | OXYGEN SATURATION: 99 % | RESPIRATION RATE: 18 BRPM

## 2024-08-22 PROCEDURE — 0503F POSTPARTUM CARE VISIT: CPT | Performed by: ADVANCED PRACTICE MIDWIFE

## 2024-08-22 RX ORDER — ACETAMINOPHEN 325 MG/1
650 TABLET ORAL EVERY 4 HOURS PRN
Start: 2024-08-22

## 2024-08-22 RX ORDER — IBUPROFEN 200 MG
400 TABLET ORAL EVERY 4 HOURS PRN
Start: 2024-08-22

## 2024-08-22 NOTE — PLAN OF CARE
Goal Outcome Evaluation:           Progress: improving  Outcome Evaluation: VSS. Fundus and lochia WNL. Patient is using comfort products. Patient has had no complaints of pain this shift. Patient is ambulating and voiding. Patient is bonding well with infant

## 2024-08-22 NOTE — PROGRESS NOTES
"      RENAE Garcia  [facility]  [address]  [place] [address]  Office [phone]  Fax [phone]    [facility]  Vaginal Delivery Progress Note    Subjective   Postpartum Day 2: Vaginal Delivery    She is feeling well. Ambulating in the room with steady gait. Denies shortness of breath, dizziness, faintness, fatigue. Describes bleeding as light and voids without difficulty. Declines breastfeeding.     Objective     Vital Signs Range for the last 24 hours  Temperature: Temp:  [97.7 °F (36.5 °C)-98.4 °F (36.9 °C)] 97.7 °F (36.5 °C)   Temp Source: Temp src: Temporal   BP: BP: (111-114)/(71-82) 111/82   Pulse: Heart Rate:  [] 95   Respirations: Resp:  [16-18] 18   SPO2: SpO2:  [98 %-99 %] 99 %   O2 Amount (l/min):     O2 Devices Device (Oxygen Therapy): room air   Weight:       Admit Height:  Height: 170.2 cm (67\")      Physical Exam:  General:  no acute distresss.  Abdomen: soft, non-tender. Fundus firm  Extremities: calves non-tender, movement without difficulty, no edema    Lab results reviewed:  Yes   Rubella:  No results found for: \"RUBELLAIGGIN\" Nurse Transcribed from prenatal record --  No components found for: \"EXTRUBELQUAL\"  Rh Status:    RH type   Date Value Ref Range Status   08/20/2024 Positive  Corrected     Comment:     PRIOR ABORH RECORDS NOT AVAILABLE FOR VERIFICATION     Immunizations:   Immunization History   Administered Date(s) Administered    DTaP / IPV 08/25/2009    DTaP, Unspecified 2003, 2005, 2003, 07/08/2004, 08/01/2007    Flu Vaccine Quad PF >36MO 12/18/2019, 12/18/2019, 10/19/2020    Flu Vaccine Split Quad 10/19/2020    FluMist 2-49yrs 10/20/2015, 10/20/2015    Fluzone (or Fluarix & Flulaval for VFC) >6mos 10/19/2020    Hep A, 2 Dose 07/30/2018, 07/30/2018, 02/25/2019, 02/25/2019    Hep B / HiB 2003, 03/29/2004    Hep B, Adolescent or Pediatric 2003    Hib (PRP-OMP) 2003    Hpv9 07/30/2018, 07/30/2018, 02/25/2019, " 02/25/2019, 06/07/2019, 06/07/2019    IPV 2003, 2003, 03/29/2004, 08/01/2007    Influenza TIV (IM) 01/10/2008, 09/28/2009    MMR 07/08/2004, 08/01/2007, 08/25/2009    Meningococcal B,(Bexsero) 06/22/2015    Meningococcal MCV4P (Menactra) 06/22/2015, 06/07/2019, 06/07/2019    Tdap 06/22/2015, 05/30/2024    Varicella 03/29/2004, 08/01/2007, 08/25/2009     Lab Results (last 24 hours)       ** No results found for the last 24 hours. **            External Prenatal Results       Pregnancy Outside Results - Transcribed From Office Records - See Scanned Records For Details       Test Value Date Time    ABO  O  08/20/24 0450    Rh  Positive  08/20/24 0450    Antibody Screen  Negative  08/20/24 0450      ^ Normal  01/08/24     Varicella IgG ^ non-immune  01/08/24     Rubella ^ immune  01/08/24     Hgb  7.7 g/dL 08/21/24 0555       8.8 g/dL 08/20/24 0450       9.1 g/dL 05/30/24 0902      ^ 11.8 g/dL 01/08/24     Hct  25.7 % 08/21/24 0555       28.8 % 08/20/24 0450      ^ 37 % 01/08/24     HgB A1c        1h GTT  80 mg/dL 05/30/24 0902    3h GTT Fasting       3h GTT 1 hour       3h GTT 2 hour       3h GTT 3 hour        Gonorrhea (discrete)  Negative  08/02/24 1012      ^ not detected  01/08/24     Chlamydia (discrete)  Negative  08/02/24 1012      ^ not detected  01/08/24     RPR  Non Reactive  05/30/24 0902      ^ Non-Reactive  01/08/24     Syphils cascade: TP-Ab (FTA)  Non-Reactive  08/20/24 0450    TP-Ab       TP-Ab (EIA)       TPPA       HBsAg ^ Negative  01/08/24     Herpes Simplex Virus PCR       Herpes Simplex VIrus Culture       HIV ^ non-reactive  01/08/24     Hep C RNA Quant PCR       Hep C Antibody ^ non-reactive  01/08/24     AFP       NIPT       Cystic Fibroisis        Group B Strep  Negative  08/02/24 1012    GBS Susceptibility to Clindamycin       GBS Susceptibility to Erythromycin       Fetal Fibronectin  Negative  05/14/24 0902    Genetic Testing, Maternal Blood                 Drug Screening        Test Value Date Time    Urine Drug Screen       Amphetamine Screen ^ Negative  01/08/24     Barbiturate Screen ^ negative  01/08/24     Benzodiazepine Screen ^ Negative  01/08/24     Methadone Screen ^ Negative  01/08/24     Phencyclidine Screen ^ negative  01/08/24     Opiates Screen ^ Negative  01/08/24     THC Screen ^ Negative  01/08/24     Cocaine Screen ^ negative  01/08/24     Propoxyphene Screen       Buprenorphine Screen       Methamphetamine Screen       Oxycodone Screen       Tricyclic Antidepressants Screen                 Legend    ^: Historical                            Assessment & Plan       * No active hospital problems. *      Felicity Fuller is Day 2  post-partum  Vaginal, Spontaneous   .      Plan:  Continue current postpartum plan of care. Resume taking iron supplement. Discharge instructions provided with questions answered and understanding verbalized.      Plan for discharge reviewed with Dr. Feliciano.     This note has been signed electronically.    Heydi Darden, DNP, APRN, CNM, RNC-OB  8/22/2024  09:06 CDT

## 2024-08-22 NOTE — DISCHARGE SUMMARY
[facility]    Heydi Darden, APRN  [address]  [place] [address]  [phone]      Discharge Summary     Alexa Fuller  : 2005  MRN: [id number]  CSN: [id number]    Date of Admission: 2024   Date of Discharge:  2024   Delivering Physician: Zaria James        Admission Diagnosis: Pregnancy [Z34.90]   Discharge Diagnosis: Pregnancy at 39w3d - delivered       Procedures: 2024  - Vaginal, Spontaneous       Hospital Course  Patient is a 19 y.o.  who at 39w3d had a vaginal birth.  Her postpartum course was without complications.  On PPD #2 she was ready for discharge.  She had normal lochia and pain well controlled with oral medications.    Infant  female  fetus weighing 3730 g (8 lb 3.6 oz)   Apgars -  8 @ 1 minute /  9 @ 5 minutes.    Discharge labs  Lab Results   Component Value Date    WBC 21.72 (H) 2024    HGB 7.7 (L) 2024    HCT 25.7 (L) 2024     2024       Discharge Medications     Discharge Medications        New Medications        Instructions Start Date   acetaminophen 325 MG tablet  Commonly known as: TYLENOL   650 mg, Oral, Every 4 Hours PRN      ibuprofen 200 MG tablet  Commonly known as: ADVIL,MOTRIN   400 mg, Oral, Every 4 Hours PRN             Continue These Medications        Instructions Start Date   albuterol sulfate  (90 Base) MCG/ACT inhaler  Commonly known as: PROVENTIL HFA;VENTOLIN HFA;PROAIR HFA   1 puff, Inhalation, Every 4 Hours PRN      ferrous gluconate 324 MG tablet  Commonly known as: FERGON   324 mg, Oral, Daily With Breakfast      Prenatal 27-1 27-1 MG tablet tablet                External Prenatal Results       Pregnancy Outside Results - Transcribed From Office Records - See Scanned Records For Details       Test Value Date Time    ABO  O  240    Rh  Positive  240    Antibody Screen  Negative  24      ^ Normal  24      Varicella IgG ^ non-immune  01/08/24     Rubella ^ immune  01/08/24     Hgb  7.7 g/dL 08/21/24 0555       8.8 g/dL 08/20/24 0450       9.1 g/dL 05/30/24 0902      ^ 11.8 g/dL 01/08/24     Hct  25.7 % 08/21/24 0555       28.8 % 08/20/24 0450      ^ 37 % 01/08/24     HgB A1c        1h GTT  80 mg/dL 05/30/24 0902    3h GTT Fasting       3h GTT 1 hour       3h GTT 2 hour       3h GTT 3 hour        Gonorrhea (discrete)  Negative  08/02/24 1012      ^ not detected  01/08/24     Chlamydia (discrete)  Negative  08/02/24 1012      ^ not detected  01/08/24     RPR  Non Reactive  05/30/24 0902      ^ Non-Reactive  01/08/24     Syphils cascade: TP-Ab (FTA)  Non-Reactive  08/20/24 0450    TP-Ab       TP-Ab (EIA)       TPPA       HBsAg ^ Negative  01/08/24     Herpes Simplex Virus PCR       Herpes Simplex VIrus Culture       HIV ^ non-reactive  01/08/24     Hep C RNA Quant PCR       Hep C Antibody ^ non-reactive  01/08/24     AFP       NIPT       Cystic Fibroisis        Group B Strep  Negative  08/02/24 1012    GBS Susceptibility to Clindamycin       GBS Susceptibility to Erythromycin       Fetal Fibronectin  Negative  05/14/24 0902    Genetic Testing, Maternal Blood                 Drug Screening       Test Value Date Time    Urine Drug Screen       Amphetamine Screen ^ Negative  01/08/24     Barbiturate Screen ^ negative  01/08/24     Benzodiazepine Screen ^ Negative  01/08/24     Methadone Screen ^ Negative  01/08/24     Phencyclidine Screen ^ negative  01/08/24     Opiates Screen ^ Negative  01/08/24     THC Screen ^ Negative  01/08/24     Cocaine Screen ^ negative  01/08/24     Propoxyphene Screen       Buprenorphine Screen       Methamphetamine Screen       Oxycodone Screen       Tricyclic Antidepressants Screen                 Legend    ^: Historical                            Discharge Disposition Home or Self Care   Condition on Discharge: good   Follow-up: 6 weeks with Dr. James or JOAQUNIA Geller       Plan for discharge  reviewed with Dr. Feliciano.    This note has been signed electronically.    Heydi Darden DNP, APRN, CNM, RNC-OB  8/22/2024

## 2024-08-22 NOTE — PAYOR COMM NOTE
"REF: [id number]       [facility]  [phone]       Arron Fuller (19 y.o. Female)       Date of Birth   2005    Social Security Number       Address   [address] [address]    Home Phone   [phone]    MRN   [id number]       [employer]   [facility]    Marital Status   Single                            Admission Date   8/20/24    Admission Type   Elective    Admitting Provider   Grzegorz Hernandez MD    Attending Provider       Department, Room/Bed   [facility] MOTHER BABY 2A, M239/1       Discharge Date   8/22/2024    Discharge Disposition   Home or Self Care    Discharge Destination                                 Attending Provider: (none)   Allergies: Codeine    Isolation: None   Infection: None   Code Status: Prior    Ht: 170.2 cm (67\")   Wt: 85.3 kg (188 lb)    Admission Cmt: None   Principal Problem: Pregnancy [Z34.90]                   Active Insurance as of 8/20/2024       Primary Coverage       Payor Plan Insurance Group Employer/Plan Group    [employer] [place] [employer]ICA[place]        Payor Plan Address Payor Plan Phone Number Payor Plan Fax Number Effective Dates    [address] [address] [phone]  3/12/2017 - None Entered    [facility] [id number]         Subscriber Name Subscriber Birth Date Member ID       ARRON FULLER 2005 [id number]                     Emergency Contacts        (Rel.) Home Phone Work Phone Mobile Phone    Angela Alexander (Mother) [phone] -- --    ANNITA ROMAIN (Father) [phone] -- --                 Discharge Summary        Heydi Darden APRN at 08/22/24 0921       Attestation signed by Domingo Feliciano MD at 08/22/24 1315    I have reviewed this documentation and agree.                  [facility]    RENAE Garcia  [address]  [place] [id number]  [phone]      Discharge Summary    [facility]  Arron Garcia " Jonny  : 2005  MRN: [id number]  CSN: [id number]    Date of Admission: 2024   Date of Discharge:  2024   Delivering Physician: Zaria James        Admission Diagnosis: Pregnancy [Z34.90]   Discharge Diagnosis: Pregnancy at 39w3d - delivered       Procedures: 2024  - Vaginal, Spontaneous       Hospital Course  Patient is a 19 y.o.  who at 39w3d had a vaginal birth.  Her postpartum course was without complications.  On PPD #2 she was ready for discharge.  She had normal lochia and pain well controlled with oral medications.    Infant  female  fetus weighing 3730 g (8 lb 3.6 oz)   Apgars -  8 @ 1 minute /  9 @ 5 minutes.    Discharge labs  Lab Results   Component Value Date    WBC 21.72 (H) 2024    HGB 7.7 (L) 2024    HCT 25.7 (L) 2024     2024       Discharge Medications     Discharge Medications        New Medications        Instructions Start Date   acetaminophen 325 MG tablet  Commonly known as: TYLENOL   650 mg, Oral, Every 4 Hours PRN      ibuprofen 200 MG tablet  Commonly known as: ADVIL,MOTRIN   400 mg, Oral, Every 4 Hours PRN             Continue These Medications        Instructions Start Date   albuterol sulfate  (90 Base) MCG/ACT inhaler  Commonly known as: PROVENTIL HFA;VENTOLIN HFA;PROAIR HFA   1 puff, Inhalation, Every 4 Hours PRN      ferrous gluconate 324 MG tablet  Commonly known as: FERGON   324 mg, Oral, Daily With Breakfast      Prenatal 27-1 27-1 MG tablet tablet                External Prenatal Results       Pregnancy Outside Results - Transcribed From Office Records - See Scanned Records For Details       Test Value Date Time    ABO  O  24 0450    Rh  Positive  24 0450    Antibody Screen  Negative  24 0450      ^ Normal  24     Varicella IgG ^ non-immune  24     Rubella ^ immune  24     Hgb  7.7 g/dL 24 0555       8.8 g/dL 24 0450       9.1 g/dL 24 0902      ^ 11.8 g/dL  01/08/24     Hct  25.7 % 08/21/24 0555       28.8 % 08/20/24 0450      ^ 37 % 01/08/24     HgB A1c        1h GTT  80 mg/dL 05/30/24 0902    3h GTT Fasting       3h GTT 1 hour       3h GTT 2 hour       3h GTT 3 hour        Gonorrhea (discrete)  Negative  08/02/24 1012      ^ not detected  01/08/24     Chlamydia (discrete)  Negative  08/02/24 1012      ^ not detected  01/08/24     RPR  Non Reactive  05/30/24 0902      ^ Non-Reactive  01/08/24     Syphils cascade: TP-Ab (FTA)  Non-Reactive  08/20/24 0450    TP-Ab       TP-Ab (EIA)       TPPA       HBsAg ^ Negative  01/08/24     Herpes Simplex Virus PCR       Herpes Simplex VIrus Culture       HIV ^ non-reactive  01/08/24     Hep C RNA Quant PCR       Hep C Antibody ^ non-reactive  01/08/24     AFP       NIPT       Cystic Fibroisis        Group B Strep  Negative  08/02/24 1012    GBS Susceptibility to Clindamycin       GBS Susceptibility to Erythromycin       Fetal Fibronectin  Negative  05/14/24 0902    Genetic Testing, Maternal Blood                 Drug Screening       Test Value Date Time    Urine Drug Screen       Amphetamine Screen ^ Negative  01/08/24     Barbiturate Screen ^ negative  01/08/24     Benzodiazepine Screen ^ Negative  01/08/24     Methadone Screen ^ Negative  01/08/24     Phencyclidine Screen ^ negative  01/08/24     Opiates Screen ^ Negative  01/08/24     THC Screen ^ Negative  01/08/24     Cocaine Screen ^ negative  01/08/24     Propoxyphene Screen       Buprenorphine Screen       Methamphetamine Screen       Oxycodone Screen       Tricyclic Antidepressants Screen                 Legend    ^: Historical                            Discharge Disposition Home or Self Care   Condition on Discharge: good   Follow-up: 6 weeks with Dr. James or JOAQUINA White       Plan for discharge reviewed with Dr. Feliciano.    This note has been signed electronically.    Heydi Darden, DNP, APRN, CNM, RNC-OB  8/22/2024        Electronically signed by Domingo Feliciano  MD TRISTIN at 08/22/24 1315       Discharge Order (From admission, onward)       Start     Ordered    08/22/24 0917  Discharge patient  Once        Expected Discharge Date: 08/22/24   Discharge Disposition: Home or Self Care   Physician of Record for Attribution - Please select from Treatment Team: JAN HE [[id number]]   Review needed by CMO to determine Physician of Record: No      Question Answer Comment   Physician of Record for Attribution - Please select from Treatment Team JAN HE    Review needed by CMO to determine Physician of Record No        08/22/24 0920

## 2024-08-31 LAB
CYTO UR: NORMAL
LAB AP CASE REPORT: NORMAL
Lab: NORMAL
PATH REPORT.FINAL DX SPEC: NORMAL
PATH REPORT.GROSS SPEC: NORMAL

## 2024-09-05 ENCOUNTER — TELEPHONE (OUTPATIENT)
Age: 19
End: 2024-09-05
Payer: COMMERCIAL

## 2024-09-05 NOTE — TELEPHONE ENCOUNTER
Pt called and c/o pelvic/hip pain that worsens with walking or laying down, pain is on both sides, she had  with you on .  Do you want me to bring pt in to be seen or does she need to see PCP?

## 2024-09-06 NOTE — TELEPHONE ENCOUNTER
I suspect this is secondary to recent delivery and will get better with time, or is because of the way she is standing holding baby on her hip.  She can use heat and tylenol/ibuprofen.  Encourage her to pay attention to her posture when she is standing for a while holding baby.

## 2024-09-06 NOTE — TELEPHONE ENCOUNTER
Called to inform pt of Dr. James recommendations, no answer, left voicemail to return my call at her earliest convenience.

## 2024-09-09 NOTE — TELEPHONE ENCOUNTER
Called and informed pt that Dr. James suspects this is secondary to recent delivery and will get better with time and because of the way she was standing with baby on  her hip.  I encouraged her to use heat, tylenol, motrin.  I also encouraged her to pay attention to posture when she is standing and holding baby.

## 2024-10-01 ENCOUNTER — POSTPARTUM VISIT (OUTPATIENT)
Age: 19
End: 2024-10-01
Payer: COMMERCIAL

## 2024-10-01 VITALS
BODY MASS INDEX: 26.06 KG/M2 | SYSTOLIC BLOOD PRESSURE: 120 MMHG | DIASTOLIC BLOOD PRESSURE: 72 MMHG | HEIGHT: 67 IN | WEIGHT: 166 LBS

## 2024-10-01 DIAGNOSIS — N94.9 ROUND LIGAMENT PAIN: ICD-10-CM

## 2024-10-01 NOTE — PROGRESS NOTES
"Subjective   Chief Complaint   Patient presents with    Postpartum Care     Pt is 6wk postpartum vaginal delivery; states she's having constant lower abdominal pain, describes as throbbing. States it's difficult to lay on side or sit up independently. PPD score 0  Pt is choosing condoms/abstinence as current form of birth control     Felicity Radha Fuller is a 19 y.o. year old  presenting to be seen for a postpartum visit.  She had a vaginal delivery.   Prenatal course was  benign.  No vaginal bleeding. No constipation. No trouble urinating. Pain in pelvis next to inner hip area.    Since delivery she has not been sexually active.  She does not have concerns about post-partum blues/depression.   She is bottle feeding.    The following portions of the patient's history were reviewed and updated as appropriate:problem list, current medications, and allergies      Review of Systems   Gastrointestinal:  Negative for constipation.   Genitourinary:  Positive for pelvic pain (hurts with movement, hurts with rest; throbbing). Negative for difficulty urinating, vaginal bleeding and vaginal pain.   Musculoskeletal:  Negative for back pain.   Psychiatric/Behavioral: Negative.           Objective   /72   Ht 170.2 cm (67\")   Wt 75.3 kg (166 lb)   LMP 2024 (Exact Date)   Breastfeeding No   BMI 26.00 kg/m²     General:  well developed; well nourished  no acute distress   Abdomen: soft, non-tender; bowel sounds normal; no masses, no organomegaly   Pelvis: Palpated round ligaments-- no tenderness to the touch; no masses or other abnormalities noted.    exam not done-- pt states everything is healed.           Diagnoses and all orders for this visit:    1. Postpartum care following vaginal delivery (Primary)  - Normal 6-week postpartum exam. Counseled on release to gradually resume regular activities of daily living.   -May resume sexually intimacy when she feels ready but encouraged to use lots of " lubricant and take things slowly. If it feels uncomfortable, stop and try again later.  -Encouraged to practice kegel exercises for pelvic floor strength/integrity for both bladder control and for sexual wellness. Information included in AVS.  - Encouraged to take time to care for self and discussed measures of support (relaxation, meditation, warm epsom salt bath, rest, exercise, aromatherapy) as part of healthy lifestyle for dealing with stressors. Also, discussed postpartum emotions and encouraged to notify provider of concerns with feelings of depression or anxiety. Postpartum emotions information in the AVS.   - Return to office annually for a well woman exam and as needed for any concerns.     2. Round ligament pain  Discussed measures to help with discomfort such as gentle stretching, soaking in epsom salt baths, may use ibuprofen/tylenol; and that it would probably take some time to get back to normal after having given birth. If pain persists, may contact us for a referral to physical therapy.       This note was electronically signed.  Leslee Geller CNM APRN  October 1, 2024

## 2024-10-01 NOTE — PATIENT INSTRUCTIONS
- Normal 6-week postpartum exam. Counseled on release to gradually resume regular activities of daily living.   -May resume sexually intimacy when she feels ready but encouraged to use lots of lubricant and take things slowly. If it feels uncomfortable, stop and try again later.  -Encouraged to practice kegel exercises for pelvic floor strength/integrity for both bladder control and for sexual wellness. Information included in AVS.  - Encouraged to take time to care for self and discussed measures of support (relaxation, meditation, warm epsom salt bath, rest, exercise, aromatherapy) as part of healthy lifestyle for dealing with stressors. Also, discussed postpartum emotions and encouraged to notify provider of concerns with feelings of depression or anxiety.    - Return to office annually for a well woman exam and as needed for any concerns.

## 2025-04-22 ENCOUNTER — OFFICE VISIT (OUTPATIENT)
Dept: FAMILY MEDICINE CLINIC | Facility: CLINIC | Age: 20
End: 2025-04-22
Payer: COMMERCIAL

## 2025-04-22 VITALS
HEIGHT: 67 IN | WEIGHT: 193 LBS | HEART RATE: 104 BPM | RESPIRATION RATE: 18 BRPM | SYSTOLIC BLOOD PRESSURE: 124 MMHG | DIASTOLIC BLOOD PRESSURE: 72 MMHG | TEMPERATURE: 98.4 F | OXYGEN SATURATION: 98 % | BODY MASS INDEX: 30.29 KG/M2

## 2025-04-22 DIAGNOSIS — E66.811 CLASS 1 OBESITY DUE TO DISRUPTION OF MC4R PATHWAY WITHOUT SERIOUS COMORBIDITY WITH BODY MASS INDEX (BMI) OF 30.0 TO 30.9 IN ADULT: ICD-10-CM

## 2025-04-22 DIAGNOSIS — N92.6 MENSTRUAL PERIOD LATE: Primary | ICD-10-CM

## 2025-04-22 DIAGNOSIS — Z15.2 CLASS 1 OBESITY DUE TO DISRUPTION OF MC4R PATHWAY WITHOUT SERIOUS COMORBIDITY WITH BODY MASS INDEX (BMI) OF 30.0 TO 30.9 IN ADULT: ICD-10-CM

## 2025-04-22 DIAGNOSIS — E88.82 CLASS 1 OBESITY DUE TO DISRUPTION OF MC4R PATHWAY WITHOUT SERIOUS COMORBIDITY WITH BODY MASS INDEX (BMI) OF 30.0 TO 30.9 IN ADULT: ICD-10-CM

## 2025-04-22 LAB
B-HCG UR QL: NEGATIVE
EXPIRATION DATE: NORMAL
INTERNAL NEGATIVE CONTROL: NORMAL
INTERNAL POSITIVE CONTROL: NORMAL
Lab: NORMAL

## 2025-04-22 PROCEDURE — 1159F MED LIST DOCD IN RCRD: CPT | Performed by: NURSE PRACTITIONER

## 2025-04-22 PROCEDURE — 99213 OFFICE O/P EST LOW 20 MIN: CPT | Performed by: NURSE PRACTITIONER

## 2025-04-22 PROCEDURE — 1160F RVW MEDS BY RX/DR IN RCRD: CPT | Performed by: NURSE PRACTITIONER

## 2025-04-22 PROCEDURE — 1126F AMNT PAIN NOTED NONE PRSNT: CPT | Performed by: NURSE PRACTITIONER

## 2025-04-22 PROCEDURE — 81025 URINE PREGNANCY TEST: CPT | Performed by: NURSE PRACTITIONER

## 2025-04-22 NOTE — PROGRESS NOTES
"Chief Complaint   Patient presents with    [facility]    positive pregnancy test     X 2         Subjective   Felicity Radha Fuller is a 20 y.o. female who presents today for the following: pregnancy    HPI   Patient has taken two home pregnancy tests and both have been faintly negative. She has been late getting her period x 10 days and is experiencing nausea. She is a patient of Dr. Garrett.    Allergies   Allergen Reactions    Codeine Irritability         OBJECTIVE:  Vitals:    04/22/25 0832   BP: 124/72   BP Location: Right arm   Patient Position: Sitting   Cuff Size: Adult   Pulse: 104   Resp: 18   Temp: 98.4 °F (36.9 °C)   TempSrc: Oral   SpO2: 98%   Weight: 87.5 kg (193 lb)   Height: 170.2 cm (67\")     Physical Exam  Vitals and nursing note reviewed.   Constitutional:       Appearance: Normal appearance.   Cardiovascular:      Rate and Rhythm: Normal rate and regular rhythm.      Pulses: Normal pulses.      Heart sounds: Normal heart sounds.   Pulmonary:      Effort: Pulmonary effort is normal.      Breath sounds: Normal breath sounds.   Skin:     General: Skin is warm and dry.   Neurological:      General: No focal deficit present.      Mental Status: She is alert and oriented to person, place, and time.   Psychiatric:         Mood and Affect: Mood normal.         Behavior: Behavior normal.         Thought Content: Thought content normal.         Judgment: Judgment normal.         BMI is >= 30 and <35. (Class 1 Obesity). The following options were offered after discussion;: weight loss educational material (shared in after visit summary), exercise counseling/recommendations, and nutrition counseling/recommendations          ASSESSMENT/ PLAN:    Diagnoses and all orders for this visit:    1. Menstrual period late (Primary)  -     POCT pregnancy, urine  -     HCG, B-subunit, Quantitative    2. 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   An After Visit Summary was printed and given to the patient at discharge.    Follow-up: Return for If pregnant, make appt with OB for further instruction.         Namia Kamara, RENAE 4/22/2025 09:38 CDT  This note was electronically signed.

## 2025-04-23 ENCOUNTER — PATIENT ROUNDING (BHMG ONLY) (OUTPATIENT)
Dept: FAMILY MEDICINE CLINIC | Facility: CLINIC | Age: 20
End: 2025-04-23
Payer: COMMERCIAL

## 2025-04-23 LAB — HCG INTACT+B SERPL-ACNC: <1 MIU/ML

## 2025-04-23 NOTE — PROGRESS NOTES
April 23, 2025    Hello, may I speak with Felicity Fuller?    My name is Rasheeda        I am  with [facility]  [address] [address] [phone].    Before we get started may I verify your date of birth? 2005    I am calling to officially welcome you to our practice and ask about your recent visit. Is this a good time to talk? yes    Tell me about your visit with us. What things went well?  Everyone was very helpful and kind        We're always looking for ways to make our patients' experiences even better. Do you have recommendations on ways we may improve?  no    Overall were you satisfied with your first visit to our practice? yes       I appreciate you taking the time to speak with me today. Is there anything else I can do for you? no      Thank you, and have a great day.

## 2025-05-20 ENCOUNTER — INITIAL PRENATAL (OUTPATIENT)
Age: 20
End: 2025-05-20
Payer: COMMERCIAL

## 2025-05-20 VITALS — DIASTOLIC BLOOD PRESSURE: 62 MMHG | BODY MASS INDEX: 32.31 KG/M2 | WEIGHT: 206.3 LBS | SYSTOLIC BLOOD PRESSURE: 104 MMHG

## 2025-05-20 DIAGNOSIS — Z3A.01 6 WEEKS GESTATION OF PREGNANCY: Primary | ICD-10-CM

## 2025-05-20 DIAGNOSIS — Z78.9 NON-SMOKER: ICD-10-CM

## 2025-05-20 RX ORDER — PRENATAL VIT NO.126/IRON/FOLIC 28MG-0.8MG
TABLET ORAL DAILY
COMMUNITY

## 2025-05-20 NOTE — PROGRESS NOTES
Reason for visit: Routine OB visit at 6w5d     CC:  Here for NEW OBV without complaints of vaginal bleeding, pain or nausea/emesis.    ROS: All systems reviewed and are negative with exception of the following    Vitals, urine, FHT, and exam noted also in the prenatal flow sheet    Exam  HEENT: NCAT, EMOI  Abdomen is soft and nontender.  Uterus is consistent with EGA  Ext are NT    Impression  Diagnoses and all orders for this visit:    1. 6 weeks gestation of pregnancy (Primary)  -     ABO / Rh  -     Ambulatory Referral to MFM/Perinatology  -     Antibody Screen  -     CBC & Differential  -     ToxASSURE Select 13 (MW) - Urine, Clean Catch  -     HCV Antibody Rfx To Qnt PCR  -     Hepatitis B Surface Antigen  -     HIV-1 / O / 2 Ag / Antibody  -     RPR, Rfx Qn RPR / Confirm TP  -     Rubella Antibody, IgG  -     Urine Culture - , Urine, Clean Catch  -     Varicella Zoster Antibody, IgG    2. Non-smoker         Vaginal bleeding warnings were given.  Pelvic pain warnings were given.  Nausea and vomiting warnings reviewed.  Patient was instructed to call the office for any concerns or problems.    Ultrasound for next appt not indicated    Alexandre Al MD

## 2025-05-26 LAB
ABO GROUP BLD: NORMAL
BACTERIA UR CULT: NORMAL
BACTERIA UR CULT: NORMAL
BASOPHILS # BLD AUTO: 0.1 X10E3/UL (ref 0–0.2)
BASOPHILS NFR BLD AUTO: 1 %
BLD GP AB SCN SERPL QL: NEGATIVE
DRUGS UR: NORMAL
EOSINOPHIL # BLD AUTO: 0.1 X10E3/UL (ref 0–0.4)
EOSINOPHIL NFR BLD AUTO: 1 %
ERYTHROCYTE [DISTWIDTH] IN BLOOD BY AUTOMATED COUNT: 16.3 % (ref 11.7–15.4)
HBV SURFACE AG SERPL QL IA: NEGATIVE
HCT VFR BLD AUTO: 37.1 % (ref 34–46.6)
HCV AB SERPL QL IA: NON REACTIVE
HCV AB SERPL QL IA: NORMAL
HGB BLD-MCNC: 11.5 G/DL (ref 11.1–15.9)
HIV 1+2 AB+HIV1 P24 AG SERPL QL IA: NON REACTIVE
IMM GRANULOCYTES # BLD AUTO: 0 X10E3/UL (ref 0–0.1)
IMM GRANULOCYTES NFR BLD AUTO: 0 %
LYMPHOCYTES # BLD AUTO: 3.1 X10E3/UL (ref 0.7–3.1)
LYMPHOCYTES NFR BLD AUTO: 32 %
MCH RBC QN AUTO: 24.6 PG (ref 26.6–33)
MCHC RBC AUTO-ENTMCNC: 31 G/DL (ref 31.5–35.7)
MCV RBC AUTO: 79 FL (ref 79–97)
MONOCYTES # BLD AUTO: 0.7 X10E3/UL (ref 0.1–0.9)
MONOCYTES NFR BLD AUTO: 8 %
NEUTROPHILS # BLD AUTO: 5.7 X10E3/UL (ref 1.4–7)
NEUTROPHILS NFR BLD AUTO: 58 %
PLATELET # BLD AUTO: 409 X10E3/UL (ref 150–450)
RBC # BLD AUTO: 4.67 X10E6/UL (ref 3.77–5.28)
RH BLD: POSITIVE
RPR SER QL: NON REACTIVE
RUBV IGG SERPL IA-ACNC: 1.04 INDEX
VZV IGG SER QL IA: NON REACTIVE
WBC # BLD AUTO: 9.7 X10E3/UL (ref 3.4–10.8)

## 2025-05-28 ENCOUNTER — REFERRAL TRIAGE (OUTPATIENT)
Dept: LABOR AND DELIVERY | Facility: HOSPITAL | Age: 20
End: 2025-05-28
Payer: COMMERCIAL

## 2025-06-20 ENCOUNTER — ROUTINE PRENATAL (OUTPATIENT)
Age: 20
End: 2025-06-20
Payer: COMMERCIAL

## 2025-06-20 VITALS — SYSTOLIC BLOOD PRESSURE: 118 MMHG | DIASTOLIC BLOOD PRESSURE: 76 MMHG | BODY MASS INDEX: 30.32 KG/M2 | WEIGHT: 193.6 LBS

## 2025-06-20 DIAGNOSIS — Z3A.11 11 WEEKS GESTATION OF PREGNANCY: Primary | ICD-10-CM

## 2025-06-20 DIAGNOSIS — Z36.0 ENCOUNTER FOR ANTENATAL SCREENING FOR CHROMOSOMAL ANOMALIES: ICD-10-CM

## 2025-06-20 DIAGNOSIS — Z34.81 PRENATAL CARE, SUBSEQUENT PREGNANCY, FIRST TRIMESTER: ICD-10-CM

## 2025-06-20 DIAGNOSIS — O26.891 VAGINAL DISCHARGE DURING PREGNANCY IN FIRST TRIMESTER: ICD-10-CM

## 2025-06-20 DIAGNOSIS — Z31.430 ENCOUNTER OF FEMALE FOR TESTING FOR GENETIC DISEASE CARRIER STATUS FOR PROCREATIVE MANAGEMENT: ICD-10-CM

## 2025-06-20 DIAGNOSIS — N89.8 VAGINAL DISCHARGE DURING PREGNANCY IN FIRST TRIMESTER: ICD-10-CM

## 2025-06-20 DIAGNOSIS — B37.49 CANDIDIASIS OF PERINEUM: ICD-10-CM

## 2025-06-20 DIAGNOSIS — O21.9 PREGNANCY RELATED NAUSEA AND VOMITING, ANTEPARTUM: ICD-10-CM

## 2025-06-20 DIAGNOSIS — Z12.4 SCREENING FOR CERVICAL CANCER: ICD-10-CM

## 2025-06-20 LAB
BILIRUB BLD-MCNC: NEGATIVE MG/DL
CLARITY, POC: ABNORMAL
COLOR UR: YELLOW
GLUCOSE UR STRIP-MCNC: NEGATIVE MG/DL
KETONES UR QL: NEGATIVE
LEUKOCYTE EST, POC: ABNORMAL
NITRITE UR-MCNC: NEGATIVE MG/ML
PH UR: 6.5 [PH] (ref 5–8)
PROT UR STRIP-MCNC: ABNORMAL MG/DL
RBC # UR STRIP: ABNORMAL /UL
SP GR UR: 1.02 (ref 1–1.03)
UROBILINOGEN UR QL: ABNORMAL

## 2025-06-20 PROCEDURE — G0123 SCREEN CERV/VAG THIN LAYER: HCPCS | Performed by: ADVANCED PRACTICE MIDWIFE

## 2025-06-20 RX ORDER — NYSTATIN AND TRIAMCINOLONE ACETONIDE 100000; 1 [USP'U]/G; MG/G
1 CREAM TOPICAL 2 TIMES DAILY
Qty: 60 G | Refills: 0 | Status: SHIPPED | OUTPATIENT
Start: 2025-06-20

## 2025-06-20 RX ORDER — ONDANSETRON 4 MG/1
4 TABLET, ORALLY DISINTEGRATING ORAL EVERY 8 HOURS PRN
Qty: 30 TABLET | Refills: 1 | Status: SHIPPED | OUTPATIENT
Start: 2025-06-20

## 2025-06-20 NOTE — PROGRESS NOTES
Reason for visit: Routine OB visit at 11w1d    History of Present Illness  The patient is a 20-year-old female who presents for a prenatal visit at 11 weeks gestation.    She reports persistent nausea, which she manages with peppermint candy. Zofran has been effective in alleviating her symptoms, but she currently does not have any on hand. She has not tried Unisom or B6 for symptom management. She also reports episodes of vomiting, occurring 2 to 3 times daily, often triggered by car travel. However, she notes a recent decrease in the frequency of these episodes.    She suspects the presence of a yeast infection.       ROS: All systems reviewed and are negative with exception as noted above.       /76   Wt 87.8 kg (193 lb 9.6 oz)   LMP 2025 (Exact Date)   BMI 30.32 kg/m²   Total weight gain: 13 kg (28 lb 9.6 oz)  Total weight gain expected 7 kg (15 lb)-11.5 kg (25 lb)    Prenatal Assessment  Fetal Heart Rate: 165  Movement: Increased        Exam:  General Appearance:  No visualized signs of distress. Normal mood and behavior.  Cardiorespiratory: HR str and reg. Lungs clear. Breathing even and unlabored.  Abdomen: soft and nontender. No CVA tenderness. Gravid uterus.  Extremeties: Calves non-tender.  Edema  LLE Edema: None  RLE Edema: None  Facial Edema: None.   : cervix closed, vaginal discharge noted; erythema to perineum       Postpartum Depression: Low Risk  (2025)    [place]  Depression Scale     Last EPDS Total Score: 0     Last EPDS Self Harm Result: Never         Impression  Diagnoses and all orders for this visit:    1. 11 weeks gestation of pregnancy (Primary)  -     POC Urinalysis Dipstick  -     Roxy Horizon 14 (Pan-Ethnic Standard) - Blood, Blood, Venous  -     [facility]oxy Panorama Prenatal Test: Chromosomes 13, 18, 21, X & Y: Triploidy 22Q.11.2 Deletion - Blood, Blood, Venous  -     Cancel: NuSwab VG+ - Swab, Vagina  -     NuSwab VG+ - Swab, Vagina    2. Prenatal care,  subsequent pregnancy, first trimester    3. Pregnancy related nausea and vomiting, antepartum  -     ondansetron ODT (ZOFRAN-ODT) 4 MG disintegrating tablet; Take 1 tablet by mouth Every 8 (Eight) Hours As Needed for Nausea or Vomiting.  Dispense: 30 tablet; Refill: 1    4. Screening for cervical cancer  -     Liquid-based Pap Smear, Screening    5. Vaginal discharge during pregnancy in first trimester  -     Cancel: NuSwab VG+ - Swab, Vagina  -     NuSwab VG+ - Swab, Vagina    6. Encounter for  screening for chromosomal anomalies  -     [place] Prenatal Test: Chromosomes 13, 18, 21, X & Y: Triploidy 22Q.11.2 Deletion - Blood, Blood, Venous    7. Encounter of female for testing for genetic disease carrier status for procreative management  -     [place] Horizon 14 (Pan-Ethnic Standard) - Blood, Blood, Venous    8. Candidiasis of perineum  -     nystatin-triamcinolone (MYCOLOG II) 315531-9.1 UNIT/GM-% cream; Apply 1 Application topically to the appropriate area as directed 2 (Two) Times a Day.  Dispense: 60 g; Refill: 0        Assessment & Plan  1. Prenatal visit at 11 weeks gestation.  Significant nausea and vomiting reported, slightly improved but persistent.  - Advise taking Unisom tablets, divided into 4 pieces, and consuming 1 or 2 of these small pieces in conjunction with a vitamin B6 tablet. Instructions will be provided in the after-visit summary.  - Discussed genetic and chromosomal screening tests and they are to be conducted today, including fetal chromosomal risk screening and maternal carrier screening for 14 conditions.  - Pap smear to be performed during this visit while assessing vaginal discharge.  - Vaginal bleeding and pelvic pain warnings.     2. Suspected yeast infection.  Symptoms suggestive of a yeast infection reported.  - Prescribe external medication for application based upon assessment.   - If the test returns positive, initiate a 7-day treatment course or advised using  Monistat over-the-counter for 7 days if needed.    Follow-up  Follow up in 4 weeks with Dr. James.    Refer to the AVS instructions for additional education provided.         This note has been signed electronically.    Heydi Darden DNP, RENAE, CNM    Patient or patient representative verbalized consent for the use of Ambient Listening during the visit with  RENAE Garcia for chart documentation. 6/20/2025  11:01 CDT

## 2025-06-23 LAB
A VAGINAE DNA VAG QL NAA+PROBE: NORMAL SCORE
BVAB2 DNA VAG QL NAA+PROBE: NORMAL SCORE
C ALBICANS DNA VAG QL NAA+PROBE: NEGATIVE
C GLABRATA DNA VAG QL NAA+PROBE: NEGATIVE
C TRACH DNA SPEC QL NAA+PROBE: NEGATIVE
MEGA1 DNA VAG QL NAA+PROBE: NORMAL SCORE
N GONORRHOEA DNA VAG QL NAA+PROBE: NEGATIVE
T VAGINALIS DNA VAG QL NAA+PROBE: NEGATIVE

## 2025-06-24 LAB
GEN CATEG CVX/VAG CYTO-IMP: ABNORMAL
LAB AP CASE REPORT: ABNORMAL
LAB AP GYN ADDITIONAL INFORMATION: ABNORMAL
Lab: ABNORMAL
PATH INTERP SPEC-IMP: ABNORMAL
STAT OF ADQ CVX/VAG CYTO-IMP: ABNORMAL

## 2025-06-27 LAB
Lab: NEGATIVE
Lab: NORMAL
NTRA ALPHA-THALASSEMIA: NEGATIVE
NTRA BETA-HEMOGLOBINOPATHIES: NEGATIVE
NTRA CANAVAN DISEASE: NEGATIVE
NTRA CYSTIC FIBROSIS: NEGATIVE
NTRA DUCHENNE/BECKER MUSCULAR DYSTROPHY: NEGATIVE
NTRA FAMILIAL DYSAUTONOMIA: NEGATIVE
NTRA FRAGILE X SYNDROME: NEGATIVE
NTRA GALACTOSEMIA: NEGATIVE
NTRA GAUCHER DISEASE: NEGATIVE
NTRA MEDIUM CHAIN ACYL-COA DEHYDROGENASE DEFICIENCY: NEGATIVE
NTRA POLYCYSTIC KIDNEY DISEASE, AUTOSOMAL RECESSIVE: NEGATIVE
NTRA SMITH-LEMLI-OPITZ SYNDROME: NEGATIVE
NTRA SPINAL MUSCULAR ATROPHY: NEGATIVE
NTRA TAY-SACHS DISEASE: NEGATIVE

## 2025-06-30 ENCOUNTER — TELEPHONE (OUTPATIENT)
Age: 20
End: 2025-06-30
Payer: COMMERCIAL

## 2025-06-30 NOTE — TELEPHONE ENCOUNTER
Spoke with patient regarding low risk NIPT results.     During our conversation, pt reports she is having worsening nausea and vomiting, dizziness and headaches. Pt reports she is having occasional cases where it is hard to keep down liquids     Please advise.

## 2025-07-09 ENCOUNTER — TELEPHONE (OUTPATIENT)
Age: 20
End: 2025-07-09
Payer: COMMERCIAL

## 2025-07-09 NOTE — TELEPHONE ENCOUNTER
Attempted to contact pt to follow up on nausea and vomiting symptoms. No answer, unable to leave vm.

## 2025-07-09 NOTE — TELEPHONE ENCOUNTER
Spoke with patient regarding nausea and vomiting, went over the nausea and vomiting tips. Pt has not been to Urgent care or ED, as she states that symptoms have not worsened. Pt denied moving appointment up sooner. Advised pt that list of tips is in her new ob folder that she received at first visit. Advised pt to contact our office or visit urgent care or ED to be evaluated if symptoms worsen.      Pt voiced understanding and had no additional questions.

## 2025-07-17 PROBLEM — Z34.80 PRENATAL CARE, SUBSEQUENT PREGNANCY: Status: ACTIVE | Noted: 2025-07-17

## 2025-07-18 ENCOUNTER — ROUTINE PRENATAL (OUTPATIENT)
Age: 20
End: 2025-07-18
Payer: COMMERCIAL

## 2025-07-18 ENCOUNTER — PATIENT OUTREACH (OUTPATIENT)
Dept: LABOR AND DELIVERY | Facility: HOSPITAL | Age: 20
End: 2025-07-18
Payer: COMMERCIAL

## 2025-07-18 VITALS — DIASTOLIC BLOOD PRESSURE: 70 MMHG | SYSTOLIC BLOOD PRESSURE: 102 MMHG | WEIGHT: 187.6 LBS | BODY MASS INDEX: 29.38 KG/M2

## 2025-07-18 DIAGNOSIS — R82.71 ASYMPTOMATIC BACTERIURIA IN PREGNANCY: ICD-10-CM

## 2025-07-18 DIAGNOSIS — R87.612 LGSIL OF CERVIX OF UNDETERMINED SIGNIFICANCE: ICD-10-CM

## 2025-07-18 DIAGNOSIS — Z3A.15 15 WEEKS GESTATION OF PREGNANCY: ICD-10-CM

## 2025-07-18 DIAGNOSIS — Z34.82 PRENATAL CARE, SUBSEQUENT PREGNANCY IN SECOND TRIMESTER: Primary | ICD-10-CM

## 2025-07-18 DIAGNOSIS — O99.891 ASYMPTOMATIC BACTERIURIA IN PREGNANCY: ICD-10-CM

## 2025-07-18 LAB
BILIRUB BLD-MCNC: ABNORMAL MG/DL
CLARITY, POC: ABNORMAL
COLOR UR: YELLOW
GLUCOSE UR STRIP-MCNC: NEGATIVE MG/DL
KETONES UR QL: NEGATIVE
LEUKOCYTE EST, POC: ABNORMAL
NITRITE UR-MCNC: POSITIVE MG/ML
PH UR: 7 [PH] (ref 5–8)
PROT UR STRIP-MCNC: ABNORMAL MG/DL
RBC # UR STRIP: ABNORMAL /UL
SP GR UR: 1.01 (ref 1–1.03)
UROBILINOGEN UR QL: NORMAL

## 2025-07-18 RX ORDER — NITROFURANTOIN 25; 75 MG/1; MG/1
100 CAPSULE ORAL 2 TIMES DAILY
Qty: 14 CAPSULE | Refills: 0 | Status: SHIPPED | OUTPATIENT
Start: 2025-07-18

## 2025-07-18 NOTE — PROGRESS NOTES
Reason for visit: Routine OB visit at 15w1d      History of Present Illness  The patient is a 20-year-old female who presents for a routine prenatal visit at 15 weeks gestation.    She reports feeling well overall and has begun to notice slight fetal movements. She has not yet determined the sex of the baby and plans to wait until the anatomy scan in 2025. She has chosen Dr. James as her obstetrician. She is curious about the safety of pregnant women canoeing. She has experienced minor spotting after intercourse, which she attributes to irritated cells and scar tissue.       ROS: All systems reviewed and are negative with exception as noted above.       /70   Wt 85.1 kg (187 lb 9.6 oz)   LMP 2025 (Exact Date)   BMI 29.38 kg/m²   Total weight gain: 10.3 kg (22 lb 9.6 oz)  Total weight gain expected 7 kg (15 lb)-11.5 kg (25 lb)    Prenatal Assessment  Fetal Heart Rate: 155  Movement: Present        Exam:  General Appearance:  No visualized signs of distress. Normal mood and behavior.  Cardiorespiratory: HR str and reg. Lungs clear. Breathing even and unlabored.  Abdomen: soft and nontender. No CVA tenderness. Gravid uterus.  Extremeties: Calves non-tender.  Edema  LLE Edema: None  RLE Edema: None  Facial Edema: None       Postpartum Depression: Low Risk  (2025)    [place]  Depression Scale     Last EPDS Total Score: 0     Last EPDS Self Harm Result: Never           Impression  Diagnoses and all orders for this visit:    1. Prenatal care, subsequent pregnancy in second trimester (Primary)    2. 15 weeks gestation of pregnancy  -     POC Urinalysis Dipstick    3. LGSIL of cervix of undetermined significance        Assessment & Plan  1. Routine prenatal visit at 15 weeks gestation.  - Blood pressure readings are within the normal range today.   - Reviewed maternal carrier and fetal chromosomal risk screening labs. The maternal carrier screening results indicate that she is not a  carrier for any of the conditions tested. The fetal chromosomal risk screening suggests a low risk for any chromosomal abnormalities, including Down syndrome, Allen syndrome, gene deletions, or gene mutations.   - Her urine test did not reveal any glucose presence but did show 1+ protein, consistent with previous findings.  - Advised to maintain adequate hydration, particularly in hot weather, and to avoid overexertion.   - Regular exercise and daily activities were recommended throughout her pregnancy.   - Instructed to report any instances of vaginal bleeding or severe pelvic pain immediately.   - A sneak peek ultrasound will be scheduled at her convenience.  - [facility] appointment scheduled for 8/21/2025 for the anatomy scan.     2. Abnormal cervical cells.  - She expressed concern about abnormal cells found in a previous test, fearing they might be precancerous. It was explained that the cervix can often heal itself by shedding cells. Also explained the pap smear is a screening and not diagnostic. The recommendation is to follow up in a year. If the follow-up test shows low-grade cells again, a colposcopy and biopsy if indicated will be considered for a more definitive diagnosis. She has completed the HPV series.     Follow-up: She will follow up with Dr. James in 4 weeks.    Refer to the AVS instructions for additional education provided.         This note has been signed electronically.    Heydi Darden, CHELSEA, APRN, CNM    Patient or patient representative verbalized consent for the use of Ambient Listening during the visit with  RENAE Garcia for chart documentation. 7/18/2025  11:37 CDT

## 2025-07-18 NOTE — OUTREACH NOTE
[facility]  Enrollment    Current Estimated Gestational Age: 15w1d    Questions/Answers      Flowsheet Row Responses   Would like to participate? Yes   Date of Intake Visit 07/18/25            [facility]  Intake    Current Estimated Gestational Age: 15w1d    Intake Assessment      Flowsheet Row Responses   Best Method for Contacting Cell   Currently Employed Yes   Able to keep appointments as scheduled Yes   Resources Presently Utilizing: WIC (Women, Infant, Children)   Maternal Warning Signs Provided   Other: Provided   Other Education HANDS, Housing Assistance, How to find a dentist, How to find a pediatrician, How to find a primary care provider, Insurance benefits/Incentives, Meds to Beds, Mental Health Services, Smoking/Vaping Cessation, SNAP Benefits, Substance Use Disorder Treatment, Transportation Assistance, WIC Benefits            Learning Assessment      Flowsheet Row Responses   Relationship Patient   Does the learner have any barriers to learning? No Barriers   What is the preferred language of the learner for medical teaching? English   How does the learner prefer to learn new concepts? Listening, Reading, Demonstration, Pictures/Video              Resource letter sent to Felicity in [facility].     Linda Multani RN  Maternity Nurse Navigator    7/18/2025, 14:12 CDT            Linda Multani RN  Maternity Nurse Navigator    7/18/2025, 14:12 CDT

## 2025-07-23 LAB
BACTERIA UR CULT: ABNORMAL
BACTERIA UR CULT: ABNORMAL
OTHER ANTIBIOTIC SUSC ISLT: ABNORMAL

## 2025-08-13 ENCOUNTER — ROUTINE PRENATAL (OUTPATIENT)
Dept: OBSTETRICS AND GYNECOLOGY | Age: 20
End: 2025-08-13
Payer: COMMERCIAL

## 2025-08-13 VITALS — DIASTOLIC BLOOD PRESSURE: 72 MMHG | WEIGHT: 188 LBS | SYSTOLIC BLOOD PRESSURE: 112 MMHG | BODY MASS INDEX: 29.44 KG/M2

## 2025-08-13 DIAGNOSIS — N39.0 URINARY TRACT INFECTION WITHOUT HEMATURIA, SITE UNSPECIFIED: ICD-10-CM

## 2025-08-13 DIAGNOSIS — Z34.82 PRENATAL CARE, SUBSEQUENT PREGNANCY IN SECOND TRIMESTER: ICD-10-CM

## 2025-08-13 DIAGNOSIS — Z3A.18 18 WEEKS GESTATION OF PREGNANCY: Primary | ICD-10-CM

## 2025-08-13 LAB
GLUCOSE UR STRIP-MCNC: NEGATIVE MG/DL
PROT UR STRIP-MCNC: ABNORMAL MG/DL

## 2025-08-18 LAB
APPEARANCE UR: ABNORMAL
BACTERIA #/AREA URNS HPF: ABNORMAL /HPF
BACTERIA UR CULT: ABNORMAL
BACTERIA UR CULT: ABNORMAL
BILIRUB UR QL STRIP: NEGATIVE
CASTS URNS MICRO: ABNORMAL
COLOR UR: YELLOW
EPI CELLS #/AREA URNS HPF: ABNORMAL /HPF
GLUCOSE UR QL STRIP: NEGATIVE
HGB UR QL STRIP: ABNORMAL
KETONES UR QL STRIP: NEGATIVE
LEUKOCYTE ESTERASE UR QL STRIP: ABNORMAL
NITRITE UR QL STRIP: POSITIVE
OTHER ANTIBIOTIC SUSC ISLT: ABNORMAL
PH UR STRIP: 5.5 [PH] (ref 5–8)
PROT UR QL STRIP: ABNORMAL
RBC #/AREA URNS HPF: ABNORMAL /HPF
SP GR UR STRIP: 1.02 (ref 1–1.03)
UROBILINOGEN UR STRIP-MCNC: ABNORMAL MG/DL
WBC #/AREA URNS HPF: ABNORMAL /HPF

## 2025-08-19 ENCOUNTER — HOSPITAL ENCOUNTER (EMERGENCY)
Facility: HOSPITAL | Age: 20
Discharge: HOME | End: 2025-08-19
Attending: OBSTETRICS & GYNECOLOGY | Admitting: OBSTETRICS & GYNECOLOGY
Payer: COMMERCIAL

## 2025-08-19 ENCOUNTER — NURSE TRIAGE (OUTPATIENT)
Dept: CALL CENTER | Facility: HOSPITAL | Age: 20
End: 2025-08-19
Payer: COMMERCIAL

## 2025-08-19 VITALS
SYSTOLIC BLOOD PRESSURE: 108 MMHG | HEART RATE: 89 BPM | HEIGHT: 67 IN | WEIGHT: 191.2 LBS | TEMPERATURE: 97.7 F | RESPIRATION RATE: 18 BRPM | DIASTOLIC BLOOD PRESSURE: 65 MMHG | BODY MASS INDEX: 30.01 KG/M2

## 2025-08-19 LAB
BACTERIA UR QL AUTO: ABNORMAL /HPF
BILIRUB UR QL STRIP: NEGATIVE
CLARITY UR: ABNORMAL
COLOR UR: YELLOW
GLUCOSE UR STRIP-MCNC: NEGATIVE MG/DL
HGB UR QL STRIP.AUTO: ABNORMAL
HYALINE CASTS UR QL AUTO: ABNORMAL /LPF
KETONES UR QL STRIP: ABNORMAL
LEUKOCYTE ESTERASE UR QL STRIP.AUTO: ABNORMAL
NITRITE UR QL STRIP: POSITIVE
PH UR STRIP.AUTO: 6 [PH] (ref 5–8)
PROT UR QL STRIP: ABNORMAL
RBC # UR STRIP: ABNORMAL /HPF
REF LAB TEST METHOD: ABNORMAL
SP GR UR STRIP: 1.02 (ref 1–1.03)
SQUAMOUS #/AREA URNS HPF: ABNORMAL /HPF
UROBILINOGEN UR QL STRIP: ABNORMAL
WBC # UR STRIP: ABNORMAL /HPF

## 2025-08-19 PROCEDURE — 81001 URINALYSIS AUTO W/SCOPE: CPT | Performed by: OBSTETRICS & GYNECOLOGY

## 2025-08-19 PROCEDURE — 87086 URINE CULTURE/COLONY COUNT: CPT | Performed by: OBSTETRICS & GYNECOLOGY

## 2025-08-19 PROCEDURE — 99283 EMERGENCY DEPT VISIT LOW MDM: CPT | Performed by: OBSTETRICS & GYNECOLOGY

## 2025-08-19 PROCEDURE — 87186 SC STD MICRODIL/AGAR DIL: CPT | Performed by: OBSTETRICS & GYNECOLOGY

## 2025-08-19 PROCEDURE — 87077 CULTURE AEROBIC IDENTIFY: CPT | Performed by: OBSTETRICS & GYNECOLOGY

## 2025-08-21 LAB — BACTERIA SPEC AEROBE CULT: ABNORMAL

## 2025-08-22 ENCOUNTER — TELEPHONE (OUTPATIENT)
Dept: OBSTETRICS AND GYNECOLOGY | Age: 20
End: 2025-08-22
Payer: COMMERCIAL

## (undated) DEVICE — CUFF,BP,DISP,1 TUBE,ADULT,HP: Brand: MEDLINE

## (undated) DEVICE — TBG SMPL FLTR LINE NASL 02/C02 A/ BX/100

## (undated) DEVICE — THE CHANNEL CLEANING BRUSH IS A NYLON FLEXI BRUSH ATTACHED TO A FLEXIBLE PLASTIC SHEATH DESIGNED TO SAFELY REMOVE DEBRIS FROM FLEXIBLE ENDOSCOPES.

## (undated) DEVICE — YANKAUER,BULB TIP WITH VENT: Brand: ARGYLE

## (undated) DEVICE — Device: Brand: DEFENDO AIR/WATER/SUCTION AND BIOPSY VALVE

## (undated) DEVICE — CONMED SCOPE SAVER BITE BLOCK, 20X27 MM: Brand: SCOPE SAVER

## (undated) DEVICE — SENSR O2 OXIMAX FNGR A/ 18IN NONSTR